# Patient Record
Sex: FEMALE | Race: WHITE | NOT HISPANIC OR LATINO | URBAN - METROPOLITAN AREA
[De-identification: names, ages, dates, MRNs, and addresses within clinical notes are randomized per-mention and may not be internally consistent; named-entity substitution may affect disease eponyms.]

---

## 2019-01-10 ENCOUNTER — EMERGENCY (EMERGENCY)
Facility: HOSPITAL | Age: 84
LOS: 0 days | Discharge: HOME | End: 2019-01-10
Attending: EMERGENCY MEDICINE | Admitting: EMERGENCY MEDICINE
Payer: MEDICARE

## 2019-01-10 VITALS
OXYGEN SATURATION: 97 % | TEMPERATURE: 98 F | RESPIRATION RATE: 17 BRPM | DIASTOLIC BLOOD PRESSURE: 72 MMHG | SYSTOLIC BLOOD PRESSURE: 165 MMHG | HEART RATE: 75 BPM

## 2019-01-10 VITALS
TEMPERATURE: 97 F | DIASTOLIC BLOOD PRESSURE: 80 MMHG | HEART RATE: 70 BPM | SYSTOLIC BLOOD PRESSURE: 190 MMHG | OXYGEN SATURATION: 96 % | WEIGHT: 125 LBS | RESPIRATION RATE: 18 BRPM | HEIGHT: 65 IN

## 2019-01-10 DIAGNOSIS — I67.89 OTHER CEREBROVASCULAR DISEASE: ICD-10-CM

## 2019-01-10 DIAGNOSIS — M47.9 SPONDYLOSIS, UNSPECIFIED: ICD-10-CM

## 2019-01-10 DIAGNOSIS — M48.00 SPINAL STENOSIS, SITE UNSPECIFIED: ICD-10-CM

## 2019-01-10 DIAGNOSIS — E78.5 HYPERLIPIDEMIA, UNSPECIFIED: ICD-10-CM

## 2019-01-10 DIAGNOSIS — I10 ESSENTIAL (PRIMARY) HYPERTENSION: ICD-10-CM

## 2019-01-10 DIAGNOSIS — I25.10 ATHEROSCLEROTIC HEART DISEASE OF NATIVE CORONARY ARTERY WITHOUT ANGINA PECTORIS: ICD-10-CM

## 2019-01-10 DIAGNOSIS — M50.30 OTHER CERVICAL DISC DEGENERATION, UNSPECIFIED CERVICAL REGION: ICD-10-CM

## 2019-01-10 DIAGNOSIS — I49.3 VENTRICULAR PREMATURE DEPOLARIZATION: ICD-10-CM

## 2019-01-10 DIAGNOSIS — M25.512 PAIN IN LEFT SHOULDER: ICD-10-CM

## 2019-01-10 DIAGNOSIS — Z88.5 ALLERGY STATUS TO NARCOTIC AGENT: ICD-10-CM

## 2019-01-10 DIAGNOSIS — M25.519 PAIN IN UNSPECIFIED SHOULDER: ICD-10-CM

## 2019-01-10 DIAGNOSIS — M19.90 UNSPECIFIED OSTEOARTHRITIS, UNSPECIFIED SITE: ICD-10-CM

## 2019-01-10 PROCEDURE — 99284 EMERGENCY DEPT VISIT MOD MDM: CPT | Mod: 25

## 2019-01-10 PROCEDURE — 71045 X-RAY EXAM CHEST 1 VIEW: CPT | Mod: 26

## 2019-01-10 PROCEDURE — 73030 X-RAY EXAM OF SHOULDER: CPT | Mod: 26,LT

## 2019-01-10 PROCEDURE — 70450 CT HEAD/BRAIN W/O DYE: CPT | Mod: 26

## 2019-01-10 PROCEDURE — 72125 CT NECK SPINE W/O DYE: CPT | Mod: 26

## 2019-01-10 RX ORDER — AZITHROMYCIN 500 MG/1
500 TABLET, FILM COATED ORAL ONCE
Qty: 0 | Refills: 0 | Status: COMPLETED | OUTPATIENT
Start: 2019-01-10 | End: 2019-01-10

## 2019-01-10 RX ADMIN — AZITHROMYCIN 500 MILLIGRAM(S): 500 TABLET, FILM COATED ORAL at 05:18

## 2019-01-10 NOTE — ED PROVIDER NOTE - PROGRESS NOTE DETAILS
possible infiltrate on xray, pt afebrile, in no respiratory distress, pox > 95% on ra.  pt given rx zpack. daughter aware.  will give first dose in ed prior to d/c
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
## 2019-01-10 NOTE — ED ADULT TRIAGE NOTE - CHIEF COMPLAINT QUOTE
as per ems, pt c/o left shoulder pain. Pt confused. Limited hx obtained in Triage. Denies chest pain, new injury or fall.

## 2019-01-10 NOTE — ED PROVIDER NOTE - NSFOLLOWUPINSTRUCTIONS_ED_ALL_ED_FT
Follow up with your doctor today  Tylenol for pain  Take zithromax as prescribed  Return to ED for any further concerns.

## 2019-01-10 NOTE — ED PROVIDER NOTE - MUSCULOSKELETAL, MLM
Spine appears normal; tender left trapezius but able to range left UE with mild discomfort.  Radial 2+,  5/5

## 2019-01-10 NOTE — ED ADULT NURSE NOTE - NSIMPLEMENTINTERV_GEN_ALL_ED
Implemented All Fall Risk Interventions:  [place] to call system. Call bell, personal items and telephone within reach. Instruct patient to call for assistance. Room bathroom lighting operational. Non-slip footwear when patient is off stretcher. Physically safe environment: no spills, clutter or unnecessary equipment. Stretcher in lowest position, wheels locked, appropriate side rails in place. Provide visual cue, wrist band, yellow gown, etc. Monitor gait and stability. Monitor for mental status changes and reorient to person, place, and time. Review medications for side effects contributing to fall risk. Reinforce activity limits and safety measures with patient and family.

## 2019-01-10 NOTE — ED ADULT NURSE NOTE - OBJECTIVE STATEMENT
Pt is a 89 y.o. female sent from [facility], c/o Left Shoulder pain/injury. per the written documentation from the facility "pt hit her head." unclear as to whether she had a fall. pt is FULL CODE. pt is A&Ox3, confused at times. daughter is at the bedside. pt is unsure as to whether she fell. per daughter the shoulder pain is chronic. uses walker at home to ambulate. no visible signs of head trauma noted. no lacerations, bumps or bruising noted. vital signs as charted. will continue to monitor.

## 2019-01-10 NOTE — ED ADULT NURSE NOTE - PMH
Arteriosclerotic heart disease (ASHD)    Deviated septum    Hypercholesterolemia    Hypertension, unspecified type    Osteoarthritis, unspecified osteoarthritis type, unspecified site    Pancreatic mass    PVCs (premature ventricular contractions)    Spinal stenosis, unspecified spinal region

## 2019-01-10 NOTE — ED PROVIDER NOTE - OBJECTIVE STATEMENT
89 yo female with h/o CAD, HTN, HLD, pancreatic mass, c/o left shoulder pain.  Patient is confused (baseline) and is not sure what happened.  States she fell but cant relate when or how.  Pt c/o pain in the left posterior neck into the left shoulder.  Patient's daughter states pt has long complained of left shoulder pain, exacerbated by a fall a few years ago.  As per RN at the assisted living, pt was found sitting in her bed c/o pain and states she fell but never was found on the floor or had any reported falls. 89 yo female with h/o CAD, HTN, HLD, pancreatic mass, c/o left shoulder pain.  Patient is confused (baseline) and is not sure what happened.  States she fell but cant relate when or how.  Pt c/o pain in the left posterior neck into the left shoulder.  Patient's daughter states pt has long complained of left shoulder pain, exacerbated by a fall a few years ago.  As per RN at the assisted living, pt was found sitting in her bed c/o pain and states she fell but never was found on the floor or had any reported falls.  Given tylenol pta.  Pt started on zpack today for URI, did not start the first dose.

## 2019-12-16 ENCOUNTER — EMERGENCY (EMERGENCY)
Facility: HOSPITAL | Age: 84
LOS: 0 days | Discharge: HOME | End: 2019-12-16
Attending: EMERGENCY MEDICINE
Payer: MEDICARE

## 2019-12-16 VITALS
SYSTOLIC BLOOD PRESSURE: 134 MMHG | WEIGHT: 130.07 LBS | OXYGEN SATURATION: 100 % | HEART RATE: 65 BPM | RESPIRATION RATE: 18 BRPM | DIASTOLIC BLOOD PRESSURE: 80 MMHG

## 2019-12-16 VITALS
HEART RATE: 77 BPM | OXYGEN SATURATION: 100 % | RESPIRATION RATE: 17 BRPM | TEMPERATURE: 98 F | SYSTOLIC BLOOD PRESSURE: 183 MMHG | DIASTOLIC BLOOD PRESSURE: 88 MMHG

## 2019-12-16 DIAGNOSIS — I10 ESSENTIAL (PRIMARY) HYPERTENSION: ICD-10-CM

## 2019-12-16 DIAGNOSIS — E78.5 HYPERLIPIDEMIA, UNSPECIFIED: ICD-10-CM

## 2019-12-16 DIAGNOSIS — Z88.5 ALLERGY STATUS TO NARCOTIC AGENT: ICD-10-CM

## 2019-12-16 DIAGNOSIS — M19.90 UNSPECIFIED OSTEOARTHRITIS, UNSPECIFIED SITE: ICD-10-CM

## 2019-12-16 DIAGNOSIS — I25.10 ATHEROSCLEROTIC HEART DISEASE OF NATIVE CORONARY ARTERY WITHOUT ANGINA PECTORIS: ICD-10-CM

## 2019-12-16 DIAGNOSIS — R55 SYNCOPE AND COLLAPSE: ICD-10-CM

## 2019-12-16 PROBLEM — J34.2 DEVIATED NASAL SEPTUM: Chronic | Status: ACTIVE | Noted: 2019-01-10

## 2019-12-16 PROBLEM — K86.9 DISEASE OF PANCREAS, UNSPECIFIED: Chronic | Status: ACTIVE | Noted: 2019-01-10

## 2019-12-16 PROBLEM — E78.00 PURE HYPERCHOLESTEROLEMIA, UNSPECIFIED: Chronic | Status: ACTIVE | Noted: 2019-01-10

## 2019-12-16 PROBLEM — M48.00 SPINAL STENOSIS, SITE UNSPECIFIED: Chronic | Status: ACTIVE | Noted: 2019-01-10

## 2019-12-16 PROBLEM — I49.3 VENTRICULAR PREMATURE DEPOLARIZATION: Chronic | Status: ACTIVE | Noted: 2019-01-10

## 2019-12-16 LAB
ALBUMIN SERPL ELPH-MCNC: 3.6 G/DL — SIGNIFICANT CHANGE UP (ref 3.3–5)
ALP SERPL-CCNC: 114 U/L — SIGNIFICANT CHANGE UP (ref 40–120)
ALT FLD-CCNC: 17 U/L — SIGNIFICANT CHANGE UP (ref 12–78)
ANION GAP SERPL CALC-SCNC: 8 MMOL/L — SIGNIFICANT CHANGE UP (ref 5–17)
APPEARANCE UR: CLEAR — SIGNIFICANT CHANGE UP
APTT BLD: 28.8 SEC — SIGNIFICANT CHANGE UP (ref 27.5–36.3)
AST SERPL-CCNC: 25 U/L — SIGNIFICANT CHANGE UP (ref 15–37)
BASOPHILS # BLD AUTO: 0.03 K/UL — SIGNIFICANT CHANGE UP (ref 0–0.2)
BASOPHILS NFR BLD AUTO: 0.6 % — SIGNIFICANT CHANGE UP (ref 0–2)
BILIRUB SERPL-MCNC: 0.5 MG/DL — SIGNIFICANT CHANGE UP (ref 0.2–1.2)
BILIRUB UR-MCNC: NEGATIVE — SIGNIFICANT CHANGE UP
BUN SERPL-MCNC: 21 MG/DL — SIGNIFICANT CHANGE UP (ref 7–23)
CALCIUM SERPL-MCNC: 10 MG/DL — SIGNIFICANT CHANGE UP (ref 8.5–10.1)
CHLORIDE SERPL-SCNC: 106 MMOL/L — SIGNIFICANT CHANGE UP (ref 96–108)
CO2 SERPL-SCNC: 24 MMOL/L — SIGNIFICANT CHANGE UP (ref 22–31)
COLOR SPEC: YELLOW — SIGNIFICANT CHANGE UP
CREAT SERPL-MCNC: 0.96 MG/DL — SIGNIFICANT CHANGE UP (ref 0.5–1.3)
DIFF PNL FLD: NEGATIVE — SIGNIFICANT CHANGE UP
EOSINOPHIL # BLD AUTO: 0.05 K/UL — SIGNIFICANT CHANGE UP (ref 0–0.5)
EOSINOPHIL NFR BLD AUTO: 0.9 % — SIGNIFICANT CHANGE UP (ref 0–6)
GLUCOSE SERPL-MCNC: 136 MG/DL — HIGH (ref 70–99)
GLUCOSE UR QL: NEGATIVE MG/DL — SIGNIFICANT CHANGE UP
HCT VFR BLD CALC: 40.7 % — SIGNIFICANT CHANGE UP (ref 34.5–45)
HGB BLD-MCNC: 12.9 G/DL — SIGNIFICANT CHANGE UP (ref 11.5–15.5)
IMM GRANULOCYTES NFR BLD AUTO: 0.4 % — SIGNIFICANT CHANGE UP (ref 0–1.5)
INR BLD: 0.98 RATIO — SIGNIFICANT CHANGE UP (ref 0.88–1.16)
KETONES UR-MCNC: NEGATIVE — SIGNIFICANT CHANGE UP
LEUKOCYTE ESTERASE UR-ACNC: NEGATIVE — SIGNIFICANT CHANGE UP
LIDOCAIN IGE QN: 94 U/L — SIGNIFICANT CHANGE UP (ref 73–393)
LYMPHOCYTES # BLD AUTO: 1.05 K/UL — SIGNIFICANT CHANGE UP (ref 1–3.3)
LYMPHOCYTES # BLD AUTO: 19.7 % — SIGNIFICANT CHANGE UP (ref 13–44)
MCHC RBC-ENTMCNC: 30.3 PG — SIGNIFICANT CHANGE UP (ref 27–34)
MCHC RBC-ENTMCNC: 31.7 GM/DL — LOW (ref 32–36)
MCV RBC AUTO: 95.5 FL — SIGNIFICANT CHANGE UP (ref 80–100)
MONOCYTES # BLD AUTO: 0.44 K/UL — SIGNIFICANT CHANGE UP (ref 0–0.9)
MONOCYTES NFR BLD AUTO: 8.3 % — SIGNIFICANT CHANGE UP (ref 2–14)
NEUTROPHILS # BLD AUTO: 3.73 K/UL — SIGNIFICANT CHANGE UP (ref 1.8–7.4)
NEUTROPHILS NFR BLD AUTO: 70.1 % — SIGNIFICANT CHANGE UP (ref 43–77)
NITRITE UR-MCNC: NEGATIVE — SIGNIFICANT CHANGE UP
PH UR: 7 — SIGNIFICANT CHANGE UP (ref 5–8)
PLATELET # BLD AUTO: 209 K/UL — SIGNIFICANT CHANGE UP (ref 150–400)
POTASSIUM SERPL-MCNC: 4.4 MMOL/L — SIGNIFICANT CHANGE UP (ref 3.5–5.3)
POTASSIUM SERPL-SCNC: 4.4 MMOL/L — SIGNIFICANT CHANGE UP (ref 3.5–5.3)
PROT SERPL-MCNC: 7.6 GM/DL — SIGNIFICANT CHANGE UP (ref 6–8.3)
PROT UR-MCNC: 15 MG/DL
PROTHROM AB SERPL-ACNC: 10.9 SEC — SIGNIFICANT CHANGE UP (ref 10–12.9)
RBC # BLD: 4.26 M/UL — SIGNIFICANT CHANGE UP (ref 3.8–5.2)
RBC # FLD: 12.9 % — SIGNIFICANT CHANGE UP (ref 10.3–14.5)
SODIUM SERPL-SCNC: 138 MMOL/L — SIGNIFICANT CHANGE UP (ref 135–145)
SP GR SPEC: 1 — LOW (ref 1.01–1.02)
TROPONIN I SERPL-MCNC: 0.02 NG/ML — SIGNIFICANT CHANGE UP (ref 0.01–0.04)
TROPONIN I SERPL-MCNC: 0.02 NG/ML — SIGNIFICANT CHANGE UP (ref 0.01–0.04)
UROBILINOGEN FLD QL: NEGATIVE MG/DL — SIGNIFICANT CHANGE UP
WBC # BLD: 5.32 K/UL — SIGNIFICANT CHANGE UP (ref 3.8–10.5)
WBC # FLD AUTO: 5.32 K/UL — SIGNIFICANT CHANGE UP (ref 3.8–10.5)

## 2019-12-16 PROCEDURE — 99284 EMERGENCY DEPT VISIT MOD MDM: CPT

## 2019-12-16 PROCEDURE — 80053 COMPREHEN METABOLIC PANEL: CPT

## 2019-12-16 PROCEDURE — 85730 THROMBOPLASTIN TIME PARTIAL: CPT

## 2019-12-16 PROCEDURE — 70450 CT HEAD/BRAIN W/O DYE: CPT

## 2019-12-16 PROCEDURE — 70450 CT HEAD/BRAIN W/O DYE: CPT | Mod: 26

## 2019-12-16 PROCEDURE — 71045 X-RAY EXAM CHEST 1 VIEW: CPT | Mod: 26

## 2019-12-16 PROCEDURE — 85610 PROTHROMBIN TIME: CPT

## 2019-12-16 PROCEDURE — 83690 ASSAY OF LIPASE: CPT

## 2019-12-16 PROCEDURE — 36415 COLL VENOUS BLD VENIPUNCTURE: CPT

## 2019-12-16 PROCEDURE — 71045 X-RAY EXAM CHEST 1 VIEW: CPT

## 2019-12-16 PROCEDURE — 81001 URINALYSIS AUTO W/SCOPE: CPT

## 2019-12-16 PROCEDURE — 93005 ELECTROCARDIOGRAM TRACING: CPT

## 2019-12-16 PROCEDURE — 93010 ELECTROCARDIOGRAM REPORT: CPT

## 2019-12-16 PROCEDURE — 84484 ASSAY OF TROPONIN QUANT: CPT

## 2019-12-16 PROCEDURE — 85025 COMPLETE CBC W/AUTO DIFF WBC: CPT

## 2019-12-16 PROCEDURE — 99284 EMERGENCY DEPT VISIT MOD MDM: CPT | Mod: 25

## 2019-12-16 RX ORDER — SODIUM CHLORIDE 9 MG/ML
500 INJECTION INTRAMUSCULAR; INTRAVENOUS; SUBCUTANEOUS ONCE
Refills: 0 | Status: COMPLETED | OUTPATIENT
Start: 2019-12-16 | End: 2019-12-16

## 2019-12-16 RX ADMIN — SODIUM CHLORIDE 1000 MILLILITER(S): 9 INJECTION INTRAMUSCULAR; INTRAVENOUS; SUBCUTANEOUS at 16:45

## 2019-12-16 NOTE — ED ADULT NURSE NOTE - OBJECTIVE STATEMENT
Pt presents to ED BIBEMS from [facility] for near syncopal episode. Pt unaware why she is here. Denies CP,

## 2019-12-16 NOTE — ED PROVIDER NOTE - NS_ ATTENDINGSCRIBEDETAILS _ED_A_ED_FT
I, Alfie Lees MD,  performed the initial face to face bedside interview with this patient regarding history of present illness, review of symptoms and relevant past medical, social and family history.  I completed an independent physical examination.  I was the initial provider who evaluated this patient.  The history, relevant review of systems, past medical and surgical history, medical decision making, and physical examination was documented by the scribe in my presence and I attest to the accuracy of the documentation.

## 2019-12-16 NOTE — ED PROVIDER NOTE - PHYSICAL EXAMINATION
Constitutional: NAD AAOx2  Eyes: PERRLA EOMI  Head: Normocephalic atraumatic  Mouth: MMM  Cardiac: regular rate   Resp: Lungs CTAB  GI: Abd s/nt/nd  Neuro: CN2-12 intact  Skin: No rashes

## 2019-12-16 NOTE — ED PROVIDER NOTE - OBJECTIVE STATEMENT
89 y/o female with a PMHx of ASHD, deviated septum, hypercholesterolemia, HTN, osteoarthritis, pancreatic mass, PVCs, spinal stenosis, presents to ED BIBEMS from [facility] for near syncopal episode. Pt unaware why she is here. Denies CP, SOB. No other complaints at this time.

## 2019-12-16 NOTE — ED PROVIDER NOTE - PROGRESS NOTE DETAILS
Patient examined at bedside with Dr. Lees. 89 y/o F presents from [facility] with near syncopal episode. Pt A&O x2, reports dry mouth. Denies CP, SOB, nausea, vomiting, fever. As per accompanying documentation, patient is ambulatory with walker at baseline. Will assess labs, EKG, UA, head CT, provide IVF and reassess. - Rene Vargas PA-C Symptoms improved, UA and 2nd troponin pending. - Rene Vargas PA-C took signout from Dr Lees.  trop neg x 2 and UA neg  OK for DC.  daughter will take her home.  MD Ramya

## 2019-12-16 NOTE — ED PROVIDER NOTE - NS ED ROS FT
Constitutional: No fever or chills  Eyes: No visual changes  HEENT: No throat pain  CV: No chest pain. +near syncope.   Resp: No SOB no cough  GI: No abd pain, nausea or vomiting  : No dysuria  MSK: No musculoskeletal pain  Skin: No rash  Neuro: No headache

## 2019-12-16 NOTE — ED PROVIDER NOTE - CLINICAL SUMMARY MEDICAL DECISION MAKING FREE TEXT BOX
89 y/o female with hx of ASHD, deviated septum, hypercholesterolemia, HTN, osteoarthritis, pancreatic mass, PVCs, spinal stenosis presents to ED for near syncope. Exam nonfocal. Will obtain labs, urine CT head, reassess.

## 2019-12-16 NOTE — ED PROVIDER NOTE - PATIENT PORTAL LINK FT
You can access the FollowMyHealth Patient Portal offered by [facility] by registering at the following website: [email or web address]. By joining [facility]’s FollowMyHealth portal, you will also be able to view your health information using other applications (apps) compatible with our system.

## 2020-06-17 ENCOUNTER — INPATIENT (INPATIENT)
Facility: HOSPITAL | Age: 85
LOS: 8 days | Discharge: HOSPICE | DRG: 175 | End: 2020-06-26
Attending: INTERNAL MEDICINE | Admitting: INTERNAL MEDICINE
Payer: MEDICARE

## 2020-06-17 VITALS
HEART RATE: 68 BPM | DIASTOLIC BLOOD PRESSURE: 99 MMHG | TEMPERATURE: 98 F | OXYGEN SATURATION: 84 % | SYSTOLIC BLOOD PRESSURE: 136 MMHG | WEIGHT: 119.93 LBS | HEIGHT: 62 IN | RESPIRATION RATE: 16 BRPM

## 2020-06-17 LAB
ALBUMIN SERPL ELPH-MCNC: 2.9 G/DL — LOW (ref 3.3–5)
ALP SERPL-CCNC: 139 U/L — HIGH (ref 40–120)
ALT FLD-CCNC: 29 U/L — SIGNIFICANT CHANGE UP (ref 12–78)
ANION GAP SERPL CALC-SCNC: 7 MMOL/L — SIGNIFICANT CHANGE UP (ref 5–17)
APPEARANCE UR: CLEAR — SIGNIFICANT CHANGE UP
APTT BLD: 29.9 SEC — SIGNIFICANT CHANGE UP (ref 27.5–36.3)
AST SERPL-CCNC: 38 U/L — HIGH (ref 15–37)
BASOPHILS # BLD AUTO: 0.04 K/UL — SIGNIFICANT CHANGE UP (ref 0–0.2)
BASOPHILS NFR BLD AUTO: 0.7 % — SIGNIFICANT CHANGE UP (ref 0–2)
BILIRUB SERPL-MCNC: 1 MG/DL — SIGNIFICANT CHANGE UP (ref 0.2–1.2)
BILIRUB UR-MCNC: NEGATIVE — SIGNIFICANT CHANGE UP
BLOOD GAS COMMENTS ARTERIAL: SIGNIFICANT CHANGE UP
BUN SERPL-MCNC: 31 MG/DL — HIGH (ref 7–23)
CALCIUM SERPL-MCNC: 9.4 MG/DL — SIGNIFICANT CHANGE UP (ref 8.5–10.1)
CHLORIDE SERPL-SCNC: 106 MMOL/L — SIGNIFICANT CHANGE UP (ref 96–108)
CO2 SERPL-SCNC: 25 MMOL/L — SIGNIFICANT CHANGE UP (ref 22–31)
COLOR SPEC: YELLOW — SIGNIFICANT CHANGE UP
CREAT SERPL-MCNC: 1.25 MG/DL — SIGNIFICANT CHANGE UP (ref 0.5–1.3)
D DIMER BLD IA.RAPID-MCNC: 708 NG/ML DDU — HIGH
DIFF PNL FLD: NEGATIVE — SIGNIFICANT CHANGE UP
EOSINOPHIL # BLD AUTO: 0.13 K/UL — SIGNIFICANT CHANGE UP (ref 0–0.5)
EOSINOPHIL NFR BLD AUTO: 2.3 % — SIGNIFICANT CHANGE UP (ref 0–6)
GAS PNL BLDA: SIGNIFICANT CHANGE UP
GLUCOSE SERPL-MCNC: 129 MG/DL — HIGH (ref 70–99)
GLUCOSE UR QL: NEGATIVE MG/DL — SIGNIFICANT CHANGE UP
HCO3 BLDA-SCNC: 25 MMOL/L — SIGNIFICANT CHANGE UP (ref 21–29)
HCT VFR BLD CALC: 45.9 % — HIGH (ref 34.5–45)
HGB BLD-MCNC: 14 G/DL — SIGNIFICANT CHANGE UP (ref 11.5–15.5)
IMM GRANULOCYTES NFR BLD AUTO: 0.4 % — SIGNIFICANT CHANGE UP (ref 0–1.5)
INR BLD: 1.11 RATIO — SIGNIFICANT CHANGE UP (ref 0.88–1.16)
KETONES UR-MCNC: NEGATIVE — SIGNIFICANT CHANGE UP
LACTATE SERPL-SCNC: 2.1 MMOL/L — HIGH (ref 0.7–2)
LEUKOCYTE ESTERASE UR-ACNC: ABNORMAL
LYMPHOCYTES # BLD AUTO: 1.2 K/UL — SIGNIFICANT CHANGE UP (ref 1–3.3)
LYMPHOCYTES # BLD AUTO: 21.4 % — SIGNIFICANT CHANGE UP (ref 13–44)
MCHC RBC-ENTMCNC: 28.5 PG — SIGNIFICANT CHANGE UP (ref 27–34)
MCHC RBC-ENTMCNC: 30.5 GM/DL — LOW (ref 32–36)
MCV RBC AUTO: 93.5 FL — SIGNIFICANT CHANGE UP (ref 80–100)
MONOCYTES # BLD AUTO: 0.78 K/UL — SIGNIFICANT CHANGE UP (ref 0–0.9)
MONOCYTES NFR BLD AUTO: 13.9 % — SIGNIFICANT CHANGE UP (ref 2–14)
NEUTROPHILS # BLD AUTO: 3.43 K/UL — SIGNIFICANT CHANGE UP (ref 1.8–7.4)
NEUTROPHILS NFR BLD AUTO: 61.3 % — SIGNIFICANT CHANGE UP (ref 43–77)
NITRITE UR-MCNC: NEGATIVE — SIGNIFICANT CHANGE UP
NT-PROBNP SERPL-SCNC: 6133 PG/ML — HIGH (ref 0–450)
PCO2 BLDA: 45 MMHG — SIGNIFICANT CHANGE UP (ref 32–46)
PH BLDA: 7.37 — SIGNIFICANT CHANGE UP (ref 7.35–7.45)
PH UR: 5 — SIGNIFICANT CHANGE UP (ref 5–8)
PLATELET # BLD AUTO: 196 K/UL — SIGNIFICANT CHANGE UP (ref 150–400)
PO2 BLDA: 287 MMHG — HIGH (ref 74–108)
POTASSIUM SERPL-MCNC: 4.3 MMOL/L — SIGNIFICANT CHANGE UP (ref 3.5–5.3)
POTASSIUM SERPL-SCNC: 4.3 MMOL/L — SIGNIFICANT CHANGE UP (ref 3.5–5.3)
PROT SERPL-MCNC: 7.1 GM/DL — SIGNIFICANT CHANGE UP (ref 6–8.3)
PROT UR-MCNC: 30 MG/DL
PROTHROM AB SERPL-ACNC: 12.4 SEC — SIGNIFICANT CHANGE UP (ref 10–12.9)
RBC # BLD: 4.91 M/UL — SIGNIFICANT CHANGE UP (ref 3.8–5.2)
RBC # FLD: 16.3 % — HIGH (ref 10.3–14.5)
SAO2 % BLDA: 100 % — HIGH (ref 92–96)
SODIUM SERPL-SCNC: 138 MMOL/L — SIGNIFICANT CHANGE UP (ref 135–145)
SP GR SPEC: 1.01 — SIGNIFICANT CHANGE UP (ref 1.01–1.02)
TROPONIN I SERPL-MCNC: 0.05 NG/ML — HIGH (ref 0.01–0.04)
TROPONIN I SERPL-MCNC: 0.06 NG/ML — HIGH (ref 0.01–0.04)
UROBILINOGEN FLD QL: NEGATIVE MG/DL — SIGNIFICANT CHANGE UP
WBC # BLD: 5.6 K/UL — SIGNIFICANT CHANGE UP (ref 3.8–10.5)
WBC # FLD AUTO: 5.6 K/UL — SIGNIFICANT CHANGE UP (ref 3.8–10.5)

## 2020-06-17 PROCEDURE — 71045 X-RAY EXAM CHEST 1 VIEW: CPT | Mod: 26

## 2020-06-17 RX ORDER — ENOXAPARIN SODIUM 100 MG/ML
60 INJECTION SUBCUTANEOUS ONCE
Refills: 0 | Status: COMPLETED | OUTPATIENT
Start: 2020-06-17 | End: 2020-06-17

## 2020-06-17 RX ORDER — AZITHROMYCIN 500 MG/1
500 TABLET, FILM COATED ORAL ONCE
Refills: 0 | Status: COMPLETED | OUTPATIENT
Start: 2020-06-17 | End: 2020-06-17

## 2020-06-17 RX ORDER — CEFTRIAXONE 500 MG/1
1000 INJECTION, POWDER, FOR SOLUTION INTRAMUSCULAR; INTRAVENOUS ONCE
Refills: 0 | Status: COMPLETED | OUTPATIENT
Start: 2020-06-17 | End: 2020-06-17

## 2020-06-17 RX ORDER — DILTIAZEM HCL 120 MG
10 CAPSULE, EXT RELEASE 24 HR ORAL ONCE
Refills: 0 | Status: COMPLETED | OUTPATIENT
Start: 2020-06-17 | End: 2020-06-17

## 2020-06-17 RX ORDER — FUROSEMIDE 40 MG
40 TABLET ORAL ONCE
Refills: 0 | Status: COMPLETED | OUTPATIENT
Start: 2020-06-17 | End: 2020-06-17

## 2020-06-17 RX ADMIN — CEFTRIAXONE 1000 MILLIGRAM(S): 500 INJECTION, POWDER, FOR SOLUTION INTRAMUSCULAR; INTRAVENOUS at 20:44

## 2020-06-17 RX ADMIN — AZITHROMYCIN 500 MILLIGRAM(S): 500 TABLET, FILM COATED ORAL at 20:44

## 2020-06-17 RX ADMIN — Medication 40 MILLIGRAM(S): at 20:45

## 2020-06-17 RX ADMIN — Medication 10 MILLIGRAM(S): at 19:37

## 2020-06-17 NOTE — H&P ADULT - NSHPREVIEWOFSYSTEMS_GEN_ALL_CORE
ROS  Gen: no fevers, chills, sweats, weight loss, fatigue  Visual: no recent changes in vision, no blurriness, no seeing spots  Cardiovascular: no chest pain, no palpitations, no orthopnea, no leg swelling  Respiratory: no shortness of breath, no exertional dyspnea, no cough, no rhinorrhea, no nasal congestion  GI: no difficulty swallowing, no nausea, no vomiting, no abdominal pain, no diarrhea, no constipation, no melana  : no dysuria, no increased freq, no hematuria, no malodorous urine  Derm: no wounds, no rashes  Heme: no easy bleeding or bruising  MSK: no joint pain, no joint swelling or redness, no extremity pain   Neuro: no headache, no numbness, no weakness, no memory loss  Psych: no depression, no anxiety, no SI ROS: unable to obtain due to pt status

## 2020-06-17 NOTE — ED ADULT NURSE NOTE - NSIMPLEMENTINTERV_GEN_ALL_ED
Implemented All Fall with Harm Risk Interventions:  [place] to call system. Call bell, personal items and telephone within reach. Instruct patient to call for assistance. Room bathroom lighting operational. Non-slip footwear when patient is off stretcher. Physically safe environment: no spills, clutter or unnecessary equipment. Stretcher in lowest position, wheels locked, appropriate side rails in place. Provide visual cue, wrist band, yellow gown, etc. Monitor gait and stability. Monitor for mental status changes and reorient to person, place, and time. Review medications for side effects contributing to fall risk. Reinforce activity limits and safety measures with patient and family. Provide visual clues: red socks.

## 2020-06-17 NOTE — H&P ADULT - NSHPPHYSICALEXAM_GEN_ALL_CORE
Vitals  T(F): 98.5 (06-17-20 @ 18:00), Max: 98.5 (06-17-20 @ 18:00)  HR: 92 (06-17-20 @ 20:45) (68 - 132)  BP: 105/86 (06-17-20 @ 20:45) (105/86 - 136/99)  RR: 16 (06-17-20 @ 17:45) (16 - 16)  SpO2: 100% (06-17-20 @ 19:35) (84% - 100%)    Physical Exam   Gen: NAD, comfortable  HENT: atraumatic head and ears, no gross abnormalities of ears, mucous membranes moist, no oral lesions, neck supple without masses/goiter/lymphadenopathy  CV: RRR, nl s1/s2, no M/R/G  Pulm: nl respiratory effort, CTAB, no wheezes/crackles/rhonchi  Back: no scoliosis, lordosis, or kyphosis, no tenderness  Abd: normoactive bowel sounds in all 4 quadrants, soft, nontender, nondistended, no rebound, no guarding, no masses  Extremities: no pedal edema, pedal pulses palpable   Skin: nl warm and dry, no wounds   Neuro: A&Ox3, answering questions appropriately, PERRL, EOMI, face symmetric, sensation equal bilaterally in face, tongue midline, no dysarthria, 5/5 strength in upper and lower extremities bilaterally, sensation intact in upper and lower extremities bilaterally, nl finger to nose, and nl heel to shin   Pysch: no depression, no SI, no HI Vitals  T(F): 98.5 (06-17-20 @ 18:00), Max: 98.5 (06-17-20 @ 18:00)  HR: 92 (06-17-20 @ 20:45) (68 - 132)  BP: 105/86 (06-17-20 @ 20:45) (105/86 - 136/99)  RR: 16 (06-17-20 @ 17:45) (16 - 16)  SpO2: 100% (06-17-20 @ 19:35) (84% - 100%)    Physical Exam   Gen: NAD, confused  HENT: atraumatic head and ears, no gross abnormalities of ears, mucous membranes moist, no oral lesions, neck supple without masses/goiter/lymphadenopathy  CV: irregularly irregular, nl s1/s2, no M/R/G, +JVD  Pulm: decreased breath sounds on right, basilar crackles noted on L  Back: no TTP  Abd: normoactive bowel sounds in all 4 quadrants, soft, nontender, nondistended, no rebound, no guarding, no masses  Extremities: 3+ pitting edema, pedal edema, pedal pulses palpable   Skin: nl warm and dry, no wounds   Neuro: A&Ox3, answering questions appropriately, PERRL, EOMI, face symmetric, sensation equal bilaterally in face, tongue midline, no dysarthria, 5/5 strength in upper and lower extremities bilaterally, sensation intact in upper and lower extremities bilaterally, nl finger to nose, and nl heel to shin   Pysch: no depression, no SI, no HI Vitals  T(F): 98.5 (06-17-20 @ 18:00), Max: 98.5 (06-17-20 @ 18:00)  HR: 92 (06-17-20 @ 20:45) (68 - 132)  BP: 105/86 (06-17-20 @ 20:45) (105/86 - 136/99)  RR: 16 (06-17-20 @ 17:45) (16 - 16)  SpO2: 100% (06-17-20 @ 19:35) (84% - 100%)    Physical Exam   Gen: NAD, confused  HENT: atraumatic head and ears, no gross abnormalities of ears, mucous membranes moist, no oral lesions, neck supple without masses/goiter/lymphadenopathy  CV: irregularly irregular, nl s1/s2, no M/R/G, +JVD  Pulm: decreased breath sounds on right, basilar crackles noted on L  Back: no TTP  Abd: normoactive bowel sounds in all 4 quadrants, soft, nontender, nondistended, no rebound, no guarding, no masses  Extremities: 3+ pitting edema up to mid-thigh   Skin: nl warm and dry  Neuro: alert and oriented, no focal deficits Vitals  T(F): 98.5 (06-17-20 @ 18:00), Max: 98.5 (06-17-20 @ 18:00)  HR: 92 (06-17-20 @ 20:45) (68 - 132)  BP: 105/86 (06-17-20 @ 20:45) (105/86 - 136/99)  RR: 16 (06-17-20 @ 17:45) (16 - 16)  SpO2: 100% (06-17-20 @ 19:35) (84% - 100%)    Physical Exam   Gen: NAD, confused  HENT: atraumatic head and ears, no gross abnormalities of ears, mucous membranes moist, no oral lesions, neck supple without masses/goiter/lymphadenopathy  CV: irregularly irregular, no M/R/G, +JVD  Pulm: decreased breath sounds on right, basilar crackles noted on L  Back: no TTP  Abd: normoactive bowel sounds in all 4 quadrants, soft, nontender, nondistended, no rebound, no guarding, no masses  Extremities: 3+ pitting edema up to mid-thigh   Skin: nl warm and dry  Neuro: alert and oriented x1, no focal deficits

## 2020-06-17 NOTE — H&P ADULT - NSICDXPASTSURGICALHX_GEN_ALL_CORE_FT
PAST SURGICAL HISTORY:  No significant past surgical history PAST SURGICAL HISTORY:  History of coronary artery stent placement approx 2005

## 2020-06-17 NOTE — H&P ADULT - ATTENDING COMMENTS
Patient was seen and examined by me after initial evaluation above with the family resident, Dr. Arechiga.  Case discussed and reviewed in detail with Dr. Arechiga. H&P edited where appropriate. Please see my plan below.    89 y/o F with a PMH CAD s/p PCI x1, b/l hearing loss, pancreatic mass, HLD, HTN, OA, and spinal stenosis brought in from [facility] due to SOB, found to have new onset afib.    Rest of History, ROS, and physical exam as noted above.  All labwork and imaging personally reviewed and interpreted.  EKG personally reviewed and interpreted. Atrial fibrillation with RVR, normal axis. [id number], QTc 449. 1/2 mm ST depressions V4-6, new, but with no reciprocal elevations.    Plan:  1) Acute respiratory failure with hypoxia  - O2 saturations mid-80s without O2, improved on NC  - New onset atrial fibrillation with RVR + CHF exacerbation +/- concern for underlying pneumonia  - More prominent pulmonary edema on R lung but also with right lung opacifications suspicious for PNA  - With borderline lactate elevation  - Pt is afebrile w/o leukocytosis, but does not r/o infection, particularly in elderly  - Cont Ceftriaxone 1g q24h and azithromycin 500mg PO q24h  - F/u Blood/Urine cx, and COVID-19 PCR. Patient is moderate risk for COVID-19  - F/u procalcitonin  - C/w NC, supportive therapy  - Afib and CHF exacerbation workup    2) Atrial fibrillation with RVR  - New onset  - CHADSVASC: 5  - Eliquis 2.5mg BID, adjusted for age and weight  - EKG showing afib w/ RVR Hr 130's  - Rate now controlled s/p Cardizem 10mg IV x 1 dose in ED  - Can c/w home Metoprolol succinate 50mg w/ holding parameters, can increase if not controlled  - May have been initiated by suspected PNA. Rx below  - F/u TSH  - Trend troponin at least x3  - Cardio consult- Dr. Campbell    3) Acute exacerbation of CHF  - in the setting of new onset Afib w/ RVR  - CXR significant for cardiomegaly, concern for pulmonary edema, and proBNP 6133  - Strict I&O's, daily weights  - Lasix 40mg IV BID  - Closely monitor renal function  - ECHO ordered  - Cardio consulted    4) Community Acquired Pneumonia  - With right sided opacifications in addition to R>L pulmonary edema  - C/w Ceftriaxone/Azithromycin QD as noted above  - F/u blood cx and COVID-19 PCR    5) Acute DVT  - D Dimer 708  - US LE doppler-notable for chronic DVT distal L femoral vein and acute L posterior tibial vein  - S/p Lovenox 60mg IM x1 in ED  - A/C w/ Eliquis 2.5mg BID. Risks and benefits of A/C discussed with daughter  - Check CTA to evaluate for PE, although no need for urgent CTA, as it will not     6) Acute Kidney Injury  - Baseline creatinine 0.9, admission creatinine 1.25  - Likely multifactorial due to afib with RVR and CHF  - Monitor closely while on Lasix  - Caution with nephrotoxic agents  - F/u AM BMP    7) Elevated troponin  - Mild, 0.058-->0.053  - Likely due to demand ischemia in the setting of afib with RVR/CHF  - Unlikely ACS  - Trend at least x3  - Cardio to be consulted as noted above    8) Prophylactic measure  - DVT PPX: IMPROVE score of 2, patient on Eliquis  - Diet: DASH  - Dispo: pending workup and improvement in condition    9) Advanced care planning/counseling discussion  - Advanced care planning was discussed with the patient's daughter, Laura Ryder ([phone]), who is the POA. She stated in the event of cardiac or pulmonary arrest, she would want the patient to not have resuscitation or intubation (DNR/DNI).  - MOLST form completed  - 16 minutes spent Patient was seen and examined by me after initial evaluation above with the family resident, Dr. Arechiga.  Case discussed and reviewed in detail with Dr. Arechiga. H&P edited where appropriate. Please see my plan below.    89 y/o F with a PMH CAD s/p PCI x1, b/l hearing loss, pancreatic mass, HLD, HTN, OA, and spinal stenosis brought in from [facility] due to SOB, found to have new onset afib.    Rest of History, ROS, and physical exam as noted above.  All labwork and imaging personally reviewed and interpreted.  EKG personally reviewed and interpreted. Atrial fibrillation with RVR, normal axis. [id number], QTc 449. 1/2 mm ST depressions V4-6, new, but with no reciprocal elevations.    Plan:  1) Acute respiratory failure with hypoxia  - O2 saturations mid-80s without O2, improved on NC  - New onset atrial fibrillation with RVR + CHF exacerbation +/- concern for underlying pneumonia  - More prominent pulmonary edema on R lung but also with right lung opacifications suspicious for PNA  - With borderline lactate elevation  - Pt is afebrile w/o leukocytosis, but does not r/o infection, particularly in elderly  - Cont Ceftriaxone 1g q24h and azithromycin 500mg PO q24h  - F/u Blood/Urine cx, and COVID-19 PCR. Patient is moderate risk for COVID-19  - F/u procalcitonin  - C/w NC, supportive therapy  - With elevated D-dimer and positive DVT, will obtain CTA, although will place on Eliquis regardless for afib  - Afib and CHF exacerbation workup    2) Atrial fibrillation with RVR  - New onset  - CHADSVASC: 5  - Eliquis 2.5mg BID, adjusted for age and weight  - EKG showing afib w/ RVR Hr 130's  - Rate now controlled s/p Cardizem 10mg IV x 1 dose in ED  - Can c/w home Metoprolol succinate 50mg w/ holding parameters, can increase if not controlled  - May have been initiated by suspected PNA. Rx below  - F/u TSH  - Trend troponin at least x3  - Cardio consult- Dr. Campbell    3) Acute exacerbation of CHF  - in the setting of new onset Afib w/ RVR  - CXR significant for cardiomegaly, concern for pulmonary edema, and proBNP 6133  - Strict I&O's, daily weights  - Lasix 40mg IV BID  - Closely monitor renal function  - ECHO ordered  - Cardio consulted    4) Community Acquired Pneumonia  - With right sided opacifications in addition to R>L pulmonary edema  - C/w Ceftriaxone/Azithromycin QD as noted above  - F/u blood cx and COVID-19 PCR    5) Acute DVT  - D Dimer 708  - US LE doppler-notable for chronic DVT distal L femoral vein and acute L posterior tibial vein  - S/p Lovenox 60mg IM x1 in ED  - A/C w/ Eliquis 2.5mg BID. Risks and benefits of A/C discussed with daughter  - Check CTA to evaluate for PE, although no need for urgent CTA, as it will not     6) Acute Kidney Injury  - Baseline creatinine 0.9, admission creatinine 1.25  - Likely multifactorial due to afib with RVR and CHF  - Monitor closely while on Lasix  - Caution with nephrotoxic agents  - F/u AM BMP    7) Elevated troponin  - Mild, 0.058-->0.053  - Likely due to demand ischemia in the setting of afib with RVR/CHF  - Unlikely ACS  - Trend at least x3  - Cardio to be consulted as noted above    8) Prophylactic measure  - DVT PPX: IMPROVE score of 2, patient on Eliquis  - Diet: DASH  - Dispo: pending workup and improvement in condition    9) Advanced care planning/counseling discussion  - Advanced care planning was discussed with the patient's daughter, Laura Ryder ([phone]), who is the POA. She stated in the event of cardiac or pulmonary arrest, she would want the patient to not have resuscitation or intubation (DNR/DNI).  - MOLST form completed  - 16 minutes spent

## 2020-06-17 NOTE — ED PROVIDER NOTE - NS_ ATTENDINGSCRIBEDETAILS _ED_A_ED_FT
I, Duy Perales MD,  performed the initial face to face bedside interview with this patient regarding history of present illness, review of symptoms and relevant past medical, social and family history.  I completed an independent physical examination.    The history, relevant review of systems, past medical and surgical history, medical decision making, and physical examination was documented by the scribe in my presence and I attest to the accuracy of the documentation.

## 2020-06-17 NOTE — ED ADULT TRIAGE NOTE - CHIEF COMPLAINT QUOTE
Patient from [facility] assisted living for SOB and new onset of rapid afib.

## 2020-06-17 NOTE — H&P ADULT - HISTORY OF PRESENT ILLNESS
89 y/o F 89 y/o F PMHx significant for ASHD s/p PCI x1, b/l hearing loss, pancreatic mass, HLD, HTN, OA, and spinal stenosis brought in from [facility] due to SOB, and new onset afib. Pt also noted to be hypoxic in 80's on RA 89 y/o F PMHx significant for ASHD s/p PCI x1, b/l hearing loss, pancreatic mass, HLD, HTN, OA, and spinal stenosis brought in from [facility] due to SOB, and new onset afib. Pt found to be hypoxic at 84% on RA and place on supp O2. Unable to obtain proper history from pt due to confusion. Pt opens eyes to commands and denies pain.     In the ED, CXR showing cardiomegaly with b/l perihilar and basilar airspace consolidations. BNP 6133, D-Dimer 708, Lactate 2.1-->1.9 s/p IVF, Lasix IV x1, CTX and Azt x1 dose, mildly elevated 89 y/o F PMHx significant for ASHD s/p PCI x1, b/l hearing loss, pancreatic mass, HLD, HTN, OA, and spinal stenosis brought in from [facility] due to SOB, and new onset afib. Pt found to be hypoxic at 84% on RA and place on supp O2. Unable to obtain proper history from pt due to confusion. Pt opens eyes to commands and denies pain.     In the ED, CXR showing cardiomegaly with b/l perihilar and basilar airspace consolidations. BNP 6133, D-Dimer 708, Lactate 2.1-->1.9 s/p IVF, Lasix 40 IV x1, CTX and Azt x1 dose, mildly elevated trops 0.058-->0.053  Pt in Afib w/ RVR 's, given Cardizem 10mg IV x1 dose, rate improved to 100's 89 y/o F PMHx significant for ASHD s/p PCI x1, b/l hearing loss, pancreatic mass, HLD, HTN, OA, and spinal stenosis brought in from [facility] due to SOB, and new onset afib. Pt found to be hypoxic at 84% on RA and place on supp O2. Unable to obtain proper history from pt due to confusion. Pt opens eyes to commands and denies pain. As per pt's daughter, pt's mental status has declined     In the ED, CXR showing cardiomegaly with b/l perihilar and basilar airspace consolidations. BNP 6133, D-Dimer 708, Lactate 2.1-->1.9 s/p IVF, Lasix 40 IV x1, CTX and Azt x1 dose, mildly elevated trops 0.058-->0.053  Pt in Afib w/ RVR 's, given Cardizem 10mg IV x1 dose, rate improved to 100's 89 y/o F PMHx significant for ASHD s/p PCI x1, b/l hearing loss, pancreatic mass, HLD, HTN, OA, and spinal stenosis brought in from [facility] due to SOB, and new onset afib. Pt found to be hypoxic at 84% on RA and place on supp O2. Unable to obtain proper history from pt due to confusion. Pt opens eyes to commands and denies pain. As per pt's daughter, pt's mental status has declined since a TIA in 2017, especially since the COVID pandemic as she has been largely isolated in her room; used to ambulate with walker but not as of recently. Recently got a 24 hour aide this week. Saw pt 2 weeks ago and noticed the increased b/l LE swelling. There has been no official diagnosis of dementia made but has been discussed with pt's PCP. Daughter is not aware if pt had fever or cough prior to ED arrival.     In the ED, CXR showing cardiomegaly with b/l perihilar and basilar airspace consolidations. BNP 6133, D-Dimer 708, Lactate 2.1-->1.9 s/p IVF, Lasix 40 IV x1, CTX and Azt x1 dose, mildly elevated trops 0.058-->0.053  Pt in Afib w/ RVR 's, given Cardizem 10mg IV x1 dose, rate improved to 100's

## 2020-06-17 NOTE — ED PROVIDER NOTE - OBJECTIVE STATEMENT
89 y/o female with PMHx of ASHD, HLD, HTN, OA, and spinal stenosis presents to the ED BIBEMS from [facility] assisted living for evaluation of new onset Afib and hypoxia. Pt endorses mild +SOB. Denies chest pain, fever, or cough. Allergic to codeine. PCP: Dr. Dionicio Mclean.

## 2020-06-17 NOTE — ED ADULT NURSE NOTE - NSFALLRSKINDICATORS_ED_ALL_ED
HISTORY OF PRESENT ILLNESS:  The patient is seen today 04/29/2017.  He is not in any cardiopulmonary 
distress.  This is a 50 years old  male with history of uncontrolled type 2 diabetes mellitu
s, severe peripheral vascular disease, status post left BKA, presented to Emergency Room on the day o
f admission because of abdominal pain that has been progressing over the last 4 days.  The patient st
ated that he has history of Crohn's disease in the past.  The patient was evaluated in the Emergency 
Room and he had blood work done that showed elevation of lipase as well as a CT scan that did not evelio
w significant abnormality except for diffuse bladder wall thickening.  Due to persistent abdominal pa
in, the patient was started on IV fluids and admitted for further management.



REVIEW OF SYSTEMS:  Other review of systems is negative.



ALLERGIES:  THE PATIENT HAS ALLERGY TO ACETAMINOPHEN, OXYCODONE AND PENICILLIN.



SOCIAL HISTORY:  Smoker for more than 25 years.  No ETOH or substance abuse.



FAMILY HISTORY:  Noncontributory.



PHYSICAL EXAMINATION:

GENERAL:  The patient is in bed, comfortable at the time of this examination.

VITAL SIGNS:  Blood pressure 113/69, temperature 97.4, respiratory rate 20 and pulse is 66.

HEENT:  Pupils equal, reactive to light.  Normal-appearing mucosa of the conjunctivae, oropharyngeal 
and nasal membrane mucosa.

NECK:  Supple, no JVD, no carotid bruit.  

CHEST AND LUNGS:  Bilateral symmetrical expansion, good air exchange, no rales, no rhonchi.

CARDIOVASCULAR:  PMI not localized.  S1, S2.  No additional sounds.

ABDOMEN:  Normoactive bowel sounds.  There is epigastric tenderness.  No organomegaly, no masses.

EXTREMITIES:  No cyanosis, no clubbing, no edema.  Left BKA.

CENTRAL NERVOUS SYSTEM:  Alert, awake, oriented x 3 and no neurological deficits except for what is b
ecause of the left BKA.  



ASSESSMENT:

1.  Acute pancreatitis.

2.  Chronic kidney disease with a GFR of 36.

3.  Uncontrolled type 2 diabetes mellitus.

4.  Anemia of chronic disease.



PLAN:  Start IV fluids.  Continue Accu-Cheks with insulin coverage.  Gastrointestinal consult and fol
low their recommendations.  Abdominal ultrasound.





__________________________________________

Liliane Brito MD







cc:



DD: 04/29/2017 10:00:49  [id number]

TT: 04/29/2017 12:03:41

Job # [id number]

cn
yes

## 2020-06-17 NOTE — H&P ADULT - NSICDXPASTMEDICALHX_GEN_ALL_CORE_FT
PAST MEDICAL HISTORY:  Arteriosclerotic heart disease (ASHD)     Deviated septum     Hypercholesterolemia     Hypertension, unspecified type     Osteoarthritis, unspecified osteoarthritis type, unspecified site     Pancreatic mass     PVCs (premature ventricular contractions)     Spinal stenosis, unspecified spinal region PAST MEDICAL HISTORY:  Arteriosclerotic heart disease (ASHD)     Deviated septum     Hypercholesterolemia     Hypertension, unspecified type     Osteoarthritis, unspecified osteoarthritis type, unspecified site     Pancreatic mass     PVCs (premature ventricular contractions)     Spinal stenosis, unspecified spinal region     Transient ischemic attack (TIA) 2017

## 2020-06-17 NOTE — H&P ADULT - ASSESSMENT
Admit to telemetry    #SOB/Hypoxia  -CXR  -s/p CTX and AZT for CAP coverage  -Supple O2  -s/p Lasix     #New Onset Afib  CHADSVASC: 5  -Recommend starting AC  -EKG  -Cardio consult    #CHF exacerbation  -Strict I&O's  -Weights  -Lasix  -ECHO    #Elevated Lactate  -2.1  -r/p 1.7    #Elevated D-Dimer  -D Dimer 708      #UMBERTO  -Baseline 0.9  -Cr 1.2 on admission  -s/p IVF  -f/u AM BMP    #Tropinemia  -0.058, r/p trop pending  -likely due to demand ischemia  -cardio consult    #DVT PPX  -Pt refused lovenox*  IMPROVE VTE Individual Risk Assessment    RISK                                                                Points    [  ] Previous VTE                                                  3    [ x  ] Thrombophilia                                               2    [  ] Lower limb paralysis                                      2        (unable to hold up >15 seconds)      [  ] Current Cancer                                              2         (within 6 months)    [  ] Immobilization > 24 hrs                                1    [  ] ICU/CCU stay > 24 hours                              1    [ x ] Age > 60                                                      1    IMPROVE VTE Score ____3_____    IMPROVE Score 0-1: Low Risk, No VTE prophylaxis required for most patients, encourage ambulation.   IMPROVE Score 2-3: At risk, pharmacologic VTE prophylaxis is indicated for most patients (in the absence of a contraindication)  IMPROVE Score > or = 4: High Risk, pharmacologic VTE prophylaxis is indicated for most patients (in the absence of a contraindication)    #Adv Directives 89 y/o F PMHx significant for ASHD s/p PCI x1, b/l hearing loss, pancreatic mass, HLD, HTN, OA, and spinal stenosis brought in from [facility] due to SOB, and new onset afib.    Admit to telemetry    #New Onset Afib  CHADSVASC: 5  -Recommend starting Eliquis 5mg BID  -EKG showing afib w/ RVR Hr 130's  -Rate improved s/p cardizem 10mg IV x 1 dose  -Cont rate control with low dose metoprolol 12.5mg BID w/ parameters  -f/u TSH  -Cardio consult    #acute CHF exacerbation  in the setting of new onset Afib w/ RVR  -s/p lasix 40mg IV x1 in ED  -Strict I&O's  -Weights  -Continue lasix 40mg IV BID  -Closely monitor renal function  -ECHO ordered  -Cardio consult    #SOB/Hypoxia, concern for underlying PNA  -CXR as noted above  -Lactate 2.1-->1.9  -SpO2 97% on 4LNC  -pt is afebrile w/o leukocytosis   -s/p CTX 1g IV x1 and azithromycin 500mg PO for CAP coverage  -f/u Blood/Urine cx  -COVID pending  -f/u procalcitonin  -CT chest    #Elevated D-Dimer  -D Dimer 708  -US LE doppler ordered to r/o DVT    #UMBERTO  -Baseline 0.9  -Cr 1.25 on admission  -Monitor closely while on lasix  -avoid nephrotoxic agents  -f/u AM BMP    #Tropinemia  -0.058-->0.053  -likely due to demand ischemia  -cardio consult    #DVT PPX  -AC w/ Eliquis 5mg BID  IMPROVE VTE Individual Risk Assessment    RISK                                                                Points    [  ] Previous VTE                                                  3    [ x  ] Thrombophilia                                               2    [  ] Lower limb paralysis                                      2        (unable to hold up >15 seconds)      [  ] Current Cancer                                              2         (within 6 months)    [  ] Immobilization > 24 hrs                                1    [  ] ICU/CCU stay > 24 hours                              1    [ x ] Age > 60                                                      1    IMPROVE VTE Score ____3_____    IMPROVE Score 0-1: Low Risk, No VTE prophylaxis required for most patients, encourage ambulation.   IMPROVE Score 2-3: At risk, pharmacologic VTE prophylaxis is indicated for most patients (in the absence of a contraindication)  IMPROVE Score > or = 4: High Risk, pharmacologic VTE prophylaxis is indicated for most patients (in the absence of a contraindication)    #Adv Directives  - 89 y/o F PMHx significant for ASHD s/p PCI x1, b/l hearing loss, pancreatic mass, HLD, HTN, OA, and spinal stenosis brought in from [facility] due to SOB, and new onset afib.    Admit to telemetry    #New Onset Afib  CHADSVASC: 5  -Eliquis 2.5mg BID, adjusted for age and weight  -EKG showing afib w/ RVR Hr 130's  -Rate improved s/p cardizem 10mg IV x 1 dose  -Cont w/ metoprolol succinate 50mg w/ holding parameters  -f/u TSH  -Cardio consult    #Acute CHF exacerbation  in the setting of new onset Afib w/ RVR  -CXR significant for cardiomegaly, concern for pulmonary edema   -proBNP 6133  -s/p lasix 40mg IV x1 in ED  -Strict I&O's  -Weights  -Continue lasix 40mg IV BID  -Closely monitor renal function  -ECHO ordered  -Cardio consult    #SOB/Hypoxia, concern for underlying PNA  -CXR notable for perihilar and basilar interstitial opacities R>L  -Lactate 2.1-->1.9  -SpO2 97% on 4LNC  -pt is afebrile w/o leukocytosis   -s/p CTX 1g IV x1 and azithromycin 500mg PO for CAP coverage  -f/u Blood/Urine cx  -COVID pending  -f/u procalcitonin  -CT chest    #Elevated D-Dimer  -D Dimer 708  -US LE doppler ordered to r/o DVT    #UMBERTO  -Baseline 0.9  -Cr 1.25 on admission  -Monitor closely while on lasix  -avoid nephrotoxic agents  -f/u AM BMP    #Tropinemia  -0.058-->0.053  -likely due to demand ischemia  -cardio consult    #DVT PPX  -AC w/ Eliquis 5mg BID  IMPROVE VTE Individual Risk Assessment    RISK                                                                Points    [  ] Previous VTE                                                  3    [ x  ] Thrombophilia                                               2    [  ] Lower limb paralysis                                      2        (unable to hold up >15 seconds)      [  ] Current Cancer                                              2         (within 6 months)    [  ] Immobilization > 24 hrs                                1    [  ] ICU/CCU stay > 24 hours                              1    [ x ] Age > 60                                                      1    IMPROVE VTE Score ____3_____    IMPROVE Score 0-1: Low Risk, No VTE prophylaxis required for most patients, encourage ambulation.   IMPROVE Score 2-3: At risk, pharmacologic VTE prophylaxis is indicated for most patients (in the absence of a contraindication)  IMPROVE Score > or = 4: High Risk, pharmacologic VTE prophylaxis is indicated for most patients (in the absence of a contraindication)    #Adv Directives  - 89 y/o F PMHx significant for ASHD s/p PCI x1, b/l hearing loss, pancreatic mass, HLD, HTN, OA, and spinal stenosis brought in from [facility] due to SOB, and new onset afib.    Admit to telemetry    #New Onset Afib  CHADSVASC: 5  -Eliquis 2.5mg BID, adjusted for age and weight  -EKG showing afib w/ RVR Hr 130's  -Rate improved s/p cardizem 10mg IV x 1 dose  -Cont w/ metoprolol succinate 50mg w/ holding parameters  -f/u TSH  -Cardio consult- Dr. Campbell    #Acute CHF exacerbation  in the setting of new onset Afib w/ RVR  -CXR significant for cardiomegaly, concern for pulmonary edema   -proBNP 6133  -s/p lasix 40mg IV x1 in ED  -Strict I&O's  -Weights  -Continue lasix 40mg IV BID  -Closely monitor renal function  -ECHO ordered  -Cardio consulted    #SOB/Hypoxia, concern for underlying PNA  -CXR notable for perihilar and basilar interstitial opacities R>L  -Lactate 2.1-->1.9  -SpO2 97% on 4LNC  -pt is afebrile w/o leukocytosis   -s/p CTX 1g IV x1 and azithromycin 500mg PO for CAP coverage  -Cont CTX 1g q24h and azithromycin 500mg PO q24h  -f/u Blood/Urine cx  -COVID pending  -f/u procalcitonin    #Elevated D-Dimer  -D Dimer 708  -US LE doppler-notable for chronic dvt distal L femoral vein and acute L posterior tibial vein  -s/p lovenox 60mg IM x1 in ED  -AC w/ Eliquis 2.5mg BID    #UMBERTO  -Baseline 0.9  -Cr 1.25 on admission  -Monitor closely while on lasix  -avoid nephrotoxic agents  -f/u AM BMP    #Tropinemia  -0.058-->0.053  -likely due to demand ischemia  -cardio consult    #DVT PPX  -AC w/ Eliquis 2.5 mg BID  IMPROVE VTE Individual Risk Assessment    RISK                                                                Points    [  ] Previous VTE                                                  3    [   ] Thrombophilia                                               2    [  ] Lower limb paralysis                                      2        (unable to hold up >15 seconds)      [  ] Current Cancer                                              2         (within 6 months)    [ x ] Immobilization > 24 hrs                                1    [  ] ICU/CCU stay > 24 hours                              1    [ x ] Age > 60                                                      1    IMPROVE VTE Score __2___    IMPROVE Score 0-1: Low Risk, No VTE prophylaxis required for most patients, encourage ambulation.   IMPROVE Score 2-3: At risk, pharmacologic VTE prophylaxis is indicated for most patients (in the absence of a contraindication)  IMPROVE Score > or = 4: High Risk, pharmacologic VTE prophylaxis is indicated for most patients (in the absence of a contraindication)    #Adv Directives  -Spoke with daughter who is POA, Pt DNR/DNI, MOLST filled and signed

## 2020-06-18 DIAGNOSIS — I80.10 PHLEBITIS AND THROMBOPHLEBITIS OF UNSPECIFIED FEMORAL VEIN: ICD-10-CM

## 2020-06-18 DIAGNOSIS — I48.91 UNSPECIFIED ATRIAL FIBRILLATION: ICD-10-CM

## 2020-06-18 DIAGNOSIS — Z95.5 PRESENCE OF CORONARY ANGIOPLASTY IMPLANT AND GRAFT: Chronic | ICD-10-CM

## 2020-06-18 DIAGNOSIS — R09.89 OTHER SPECIFIED SYMPTOMS AND SIGNS INVOLVING THE CIRCULATORY AND RESPIRATORY SYSTEMS: ICD-10-CM

## 2020-06-18 LAB
ANION GAP SERPL CALC-SCNC: 7 MMOL/L — SIGNIFICANT CHANGE UP (ref 5–17)
BASOPHILS # BLD AUTO: 0.04 K/UL — SIGNIFICANT CHANGE UP (ref 0–0.2)
BASOPHILS NFR BLD AUTO: 0.8 % — SIGNIFICANT CHANGE UP (ref 0–2)
BUN SERPL-MCNC: 32 MG/DL — HIGH (ref 7–23)
CALCIUM SERPL-MCNC: 9.5 MG/DL — SIGNIFICANT CHANGE UP (ref 8.5–10.1)
CHLORIDE SERPL-SCNC: 105 MMOL/L — SIGNIFICANT CHANGE UP (ref 96–108)
CO2 SERPL-SCNC: 28 MMOL/L — SIGNIFICANT CHANGE UP (ref 22–31)
CREAT SERPL-MCNC: 1.31 MG/DL — HIGH (ref 0.5–1.3)
EOSINOPHIL # BLD AUTO: 0.18 K/UL — SIGNIFICANT CHANGE UP (ref 0–0.5)
EOSINOPHIL NFR BLD AUTO: 3.5 % — SIGNIFICANT CHANGE UP (ref 0–6)
GLUCOSE SERPL-MCNC: 110 MG/DL — HIGH (ref 70–99)
HCT VFR BLD CALC: 45.8 % — HIGH (ref 34.5–45)
HGB BLD-MCNC: 14.2 G/DL — SIGNIFICANT CHANGE UP (ref 11.5–15.5)
IMM GRANULOCYTES NFR BLD AUTO: 0.2 % — SIGNIFICANT CHANGE UP (ref 0–1.5)
LYMPHOCYTES # BLD AUTO: 1.06 K/UL — SIGNIFICANT CHANGE UP (ref 1–3.3)
LYMPHOCYTES # BLD AUTO: 20.5 % — SIGNIFICANT CHANGE UP (ref 13–44)
MAGNESIUM SERPL-MCNC: 2.2 MG/DL — SIGNIFICANT CHANGE UP (ref 1.6–2.6)
MCHC RBC-ENTMCNC: 29.4 PG — SIGNIFICANT CHANGE UP (ref 27–34)
MCHC RBC-ENTMCNC: 31 GM/DL — LOW (ref 32–36)
MCV RBC AUTO: 94.8 FL — SIGNIFICANT CHANGE UP (ref 80–100)
MONOCYTES # BLD AUTO: 0.84 K/UL — SIGNIFICANT CHANGE UP (ref 0–0.9)
MONOCYTES NFR BLD AUTO: 16.2 % — HIGH (ref 2–14)
NEUTROPHILS # BLD AUTO: 3.04 K/UL — SIGNIFICANT CHANGE UP (ref 1.8–7.4)
NEUTROPHILS NFR BLD AUTO: 58.8 % — SIGNIFICANT CHANGE UP (ref 43–77)
PHOSPHATE SERPL-MCNC: 3.5 MG/DL — SIGNIFICANT CHANGE UP (ref 2.5–4.5)
PLATELET # BLD AUTO: 162 K/UL — SIGNIFICANT CHANGE UP (ref 150–400)
POTASSIUM SERPL-MCNC: 4.3 MMOL/L — SIGNIFICANT CHANGE UP (ref 3.5–5.3)
POTASSIUM SERPL-SCNC: 4.3 MMOL/L — SIGNIFICANT CHANGE UP (ref 3.5–5.3)
PROCALCITONIN SERPL-MCNC: 0.11 NG/ML — HIGH (ref 0.02–0.1)
RBC # BLD: 4.83 M/UL — SIGNIFICANT CHANGE UP (ref 3.8–5.2)
RBC # FLD: 16.3 % — HIGH (ref 10.3–14.5)
SARS-COV-2 IGG SERPL QL IA: POSITIVE
SARS-COV-2 IGM SERPL IA-ACNC: 6.26 INDEX — HIGH
SARS-COV-2 RNA SPEC QL NAA+PROBE: SIGNIFICANT CHANGE UP
SODIUM SERPL-SCNC: 140 MMOL/L — SIGNIFICANT CHANGE UP (ref 135–145)
TROPONIN I SERPL-MCNC: 0.07 NG/ML — HIGH (ref 0.01–0.04)
TSH SERPL-MCNC: 1.1 UU/ML — SIGNIFICANT CHANGE UP (ref 0.34–4.82)
WBC # BLD: 5.17 K/UL — SIGNIFICANT CHANGE UP (ref 3.8–10.5)
WBC # FLD AUTO: 5.17 K/UL — SIGNIFICANT CHANGE UP (ref 3.8–10.5)

## 2020-06-18 PROCEDURE — 85025 COMPLETE CBC W/AUTO DIFF WBC: CPT

## 2020-06-18 PROCEDURE — 93010 ELECTROCARDIOGRAM REPORT: CPT

## 2020-06-18 PROCEDURE — 86769 SARS-COV-2 COVID-19 ANTIBODY: CPT

## 2020-06-18 PROCEDURE — 93306 TTE W/DOPPLER COMPLETE: CPT | Mod: 26

## 2020-06-18 PROCEDURE — 97162 PT EVAL MOD COMPLEX 30 MIN: CPT | Mod: GP

## 2020-06-18 PROCEDURE — 93306 TTE W/DOPPLER COMPLETE: CPT

## 2020-06-18 PROCEDURE — 99233 SBSQ HOSP IP/OBS HIGH 50: CPT

## 2020-06-18 PROCEDURE — 97530 THERAPEUTIC ACTIVITIES: CPT | Mod: GP

## 2020-06-18 PROCEDURE — 84100 ASSAY OF PHOSPHORUS: CPT

## 2020-06-18 PROCEDURE — 80048 BASIC METABOLIC PNL TOTAL CA: CPT

## 2020-06-18 PROCEDURE — U0003: CPT

## 2020-06-18 PROCEDURE — 82272 OCCULT BLD FECES 1-3 TESTS: CPT

## 2020-06-18 PROCEDURE — 36415 COLL VENOUS BLD VENIPUNCTURE: CPT

## 2020-06-18 PROCEDURE — 93970 EXTREMITY STUDY: CPT | Mod: 26

## 2020-06-18 PROCEDURE — 83735 ASSAY OF MAGNESIUM: CPT

## 2020-06-18 PROCEDURE — 85027 COMPLETE CBC AUTOMATED: CPT

## 2020-06-18 PROCEDURE — 93005 ELECTROCARDIOGRAM TRACING: CPT

## 2020-06-18 PROCEDURE — 71045 X-RAY EXAM CHEST 1 VIEW: CPT

## 2020-06-18 PROCEDURE — 84145 PROCALCITONIN (PCT): CPT

## 2020-06-18 PROCEDURE — 71275 CT ANGIOGRAPHY CHEST: CPT

## 2020-06-18 PROCEDURE — 97116 GAIT TRAINING THERAPY: CPT | Mod: GP

## 2020-06-18 PROCEDURE — 71275 CT ANGIOGRAPHY CHEST: CPT | Mod: 26

## 2020-06-18 PROCEDURE — 84443 ASSAY THYROID STIM HORMONE: CPT

## 2020-06-18 RX ORDER — ATORVASTATIN CALCIUM 80 MG/1
1 TABLET, FILM COATED ORAL
Qty: 0 | Refills: 0 | DISCHARGE

## 2020-06-18 RX ORDER — CEFTRIAXONE 500 MG/1
1000 INJECTION, POWDER, FOR SOLUTION INTRAMUSCULAR; INTRAVENOUS EVERY 24 HOURS
Refills: 0 | Status: DISCONTINUED | OUTPATIENT
Start: 2020-06-18 | End: 2020-06-18

## 2020-06-18 RX ORDER — DILTIAZEM HCL 120 MG
5 CAPSULE, EXT RELEASE 24 HR ORAL
Qty: 125 | Refills: 0 | Status: DISCONTINUED | OUTPATIENT
Start: 2020-06-18 | End: 2020-06-19

## 2020-06-18 RX ORDER — APIXABAN 2.5 MG/1
2.5 TABLET, FILM COATED ORAL EVERY 12 HOURS
Refills: 0 | Status: DISCONTINUED | OUTPATIENT
Start: 2020-06-18 | End: 2020-06-18

## 2020-06-18 RX ORDER — METOPROLOL TARTRATE 50 MG
50 TABLET ORAL DAILY
Refills: 0 | Status: DISCONTINUED | OUTPATIENT
Start: 2020-06-18 | End: 2020-06-22

## 2020-06-18 RX ORDER — AZITHROMYCIN 500 MG/1
500 TABLET, FILM COATED ORAL DAILY
Refills: 0 | Status: DISCONTINUED | OUTPATIENT
Start: 2020-06-18 | End: 2020-06-22

## 2020-06-18 RX ORDER — APIXABAN 2.5 MG/1
10 TABLET, FILM COATED ORAL EVERY 12 HOURS
Refills: 0 | Status: COMPLETED | OUTPATIENT
Start: 2020-06-18 | End: 2020-06-24

## 2020-06-18 RX ORDER — CEFTRIAXONE 500 MG/1
1000 INJECTION, POWDER, FOR SOLUTION INTRAMUSCULAR; INTRAVENOUS EVERY 24 HOURS
Refills: 0 | Status: COMPLETED | OUTPATIENT
Start: 2020-06-18 | End: 2020-06-22

## 2020-06-18 RX ORDER — FUROSEMIDE 40 MG
40 TABLET ORAL
Refills: 0 | Status: DISCONTINUED | OUTPATIENT
Start: 2020-06-18 | End: 2020-06-20

## 2020-06-18 RX ORDER — APIXABAN 2.5 MG/1
10 TABLET, FILM COATED ORAL ONCE
Refills: 0 | Status: DISCONTINUED | OUTPATIENT
Start: 2020-06-18 | End: 2020-06-18

## 2020-06-18 RX ORDER — OLANZAPINE 15 MG/1
2.5 TABLET, FILM COATED ORAL ONCE
Refills: 0 | Status: DISCONTINUED | OUTPATIENT
Start: 2020-06-18 | End: 2020-06-19

## 2020-06-18 RX ORDER — ATORVASTATIN CALCIUM 80 MG/1
10 TABLET, FILM COATED ORAL AT BEDTIME
Refills: 0 | Status: DISCONTINUED | OUTPATIENT
Start: 2020-06-18 | End: 2020-06-26

## 2020-06-18 RX ADMIN — Medication 40 MILLIGRAM(S): at 18:44

## 2020-06-18 RX ADMIN — AZITHROMYCIN 500 MILLIGRAM(S): 500 TABLET, FILM COATED ORAL at 21:33

## 2020-06-18 RX ADMIN — Medication 40 MILLIGRAM(S): at 10:30

## 2020-06-18 RX ADMIN — Medication 5 MG/HR: at 11:52

## 2020-06-18 RX ADMIN — ATORVASTATIN CALCIUM 10 MILLIGRAM(S): 80 TABLET, FILM COATED ORAL at 21:33

## 2020-06-18 RX ADMIN — CEFTRIAXONE 1000 MILLIGRAM(S): 500 INJECTION, POWDER, FOR SOLUTION INTRAMUSCULAR; INTRAVENOUS at 20:54

## 2020-06-18 RX ADMIN — Medication 50 MILLIGRAM(S): at 10:31

## 2020-06-18 RX ADMIN — APIXABAN 10 MILLIGRAM(S): 2.5 TABLET, FILM COATED ORAL at 22:11

## 2020-06-18 RX ADMIN — ENOXAPARIN SODIUM 60 MILLIGRAM(S): 100 INJECTION SUBCUTANEOUS at 00:26

## 2020-06-18 RX ADMIN — APIXABAN 10 MILLIGRAM(S): 2.5 TABLET, FILM COATED ORAL at 11:53

## 2020-06-18 NOTE — PROGRESS NOTE ADULT - ASSESSMENT
A/P: 89 y/o F PMHx significant for ASHD s/p PCI x1, b/l hearing loss, pancreatic mass, HLD, HTN, OA, and spinal stenosis brought in from [facility] due to SOB, and new onset afib.    1.	Acute respiratory failure w/ hypoxia, likely multifactorial due to acute PE, CHF exac and concern for GNR PNA  2.	Acute RLL PE w/ R heart strain  3.	acute LLE DVT/ chronic LLE DVT   4.	New Onset Afib  5.	Acute on chronic systolic CHF, EF 30-35%  6.	severe pulmonary HTN  7.	Elevated troponin  8.	elevated creat, doesn't meet criteria for UMBERTO at this time    -CHADSVASC: 5, started on Eliquis (higher dose due to acute dvt)  -Cardizem drip  -Cont w/ metoprolol succinate 50mg w/ holding parameters  -nml TSH  -echo findings as above  -IV lasix BID, Strict I&O's, daily Weights  -CXR notable for perihilar and basilar interstitial opacities R>L  -Lactate 2.1-->1.9  -SpO2 was 97% on 4LNC, wean off O2 as nelly  -pt is afebrile w/o leukocytosis   -cont empiric ceftriaxone and azithromycin  -f/u Blood/Urine cx  -COVID neg, antibx testing pending  -procalcitonin slightly elevated at 0.11  -Monitor renal function closely while on lasix, avoid nephrotoxic agents  -f/u AM BMP  -elevated trop likely due to demand ischemia  -cardio consult appreciated    ADVANCE DIRECTIVES/ CODE STATUS: DNR/DNI - MOLST on chart    DISPO: inpatient. tele monitoring. guarded prognosis. called etelvina, no answer, left msg to call me back. d/w pt's other daughter, Kalani [phone].

## 2020-06-18 NOTE — CONSULT NOTE ADULT - ASSESSMENT
89 y/o F PMHx significant for ASHD s/p PCI x1, b/l hearing loss, pancreatic mass, HLD, HTN, OA, and spinal stenosis brought in from [facility] due to SOB, and new onset afib.    Admit to telemetry    #New Onset Afib  CHADSVASC: 5  -Eliquis 2.5mg BID, adjusted for age and weight  -EKG showing afib w/ RVR Hr 130's  -Rate improved s/p cardizem 10mg IV x 1 dose  -Cont w/ metoprolol succinate 50mg w/ holding parameters  -f/u TSH  -start IV Cardizem    #Acute CHF exacerbation  in the setting of new onset Afib w/ RVR  -CXR significant for cardiomegaly, concern for pulmonary edema   -proBNP 6133  -s/p lasix 40mg IV x1 in ED  -Strict I&O's  -Weights  -Continue lasix 40mg IV BID  -Closely monitor renal function  -ECHO ordered  -Cardio consulted  #SOB/Hypoxia, concern for underlying PNA  -CXR notable for perihilar and basilar interstitial opacities R>L  -Lactate 2.1-->1.9  -SpO2 97% on 4LNC  -pt is afebrile w/o leukocytosis   -s/p CTX 1g IV x1 and azithromycin 500mg PO for CAP coverage  -Cont CTX 1g q24h and azithromycin 500mg PO q24h  -f/u Blood/Urine cx  -COVID pending  -f/u procalcitonin    #Elevated D-Dimer  -D Dimer 708  -US LE doppler-notable for chronic dvt distal L femoral vein and acute L posterior tibial vein  -s/p lovenox 60mg IM x1 in ED  -AC w/ Eliquis 2.5mg BID    #UMBERTO  -Baseline 0.9  -Cr 1.25 on admission  -Monitor closely while on lasix  -avoid nephrotoxic agents  -f/u AM BMP    #Tropinemia  -0.058-->0.053  -likely due to demand ischemia  -echo    #DVT PPX  -AC w/ Eliquis 2.5 mg BID  IMPROVE VTE Individual Risk Assessment

## 2020-06-18 NOTE — PROVIDER CONTACT NOTE (CRITICAL VALUE NOTIFICATION) - ASSESSMENT
Pt A&Ox1. Pt on 5L O2 saturation 91%. Afib on monitor - . LE dopplers completed. Pt + acute L post. tib. DVT, chronic L fem. DVT. Dr. Valdez and Hawk made aware. Dr. Valdez at bedside.

## 2020-06-18 NOTE — CONSULT NOTE ADULT - SUBJECTIVE AND OBJECTIVE BOX
CHIEF COMPLAINT:    HPI:  89 y/o F PMHx significant for ASHD s/p PCI x1, b/l hearing loss, pancreatic mass, HLD, HTN, OA, and spinal stenosis brought in from [facility] due to SOB, and new onset afib. Pt found to be hypoxic at 84% on RA and place on supp O2. Unable to obtain proper history from pt due to confusion. Pt opens eyes to commands and denies pain. As per pt's daughter, pt's mental status has declined since a TIA in 2017, especially since the COVID pandemic as she has been largely isolated in her room; used to ambulate with walker but not as of recently. Recently got a 24 hour aide this week. Saw pt 2 weeks ago and noticed the increased b/l LE swelling. There has been no official diagnosis of dementia made but has been discussed with pt's PCP. Daughter is not aware if pt had fever or cough prior to ED arrival.     In the ED, CXR showing cardiomegaly with b/l perihilar and basilar airspace consolidations. BNP 6133, D-Dimer 708, Lactate 2.1-->1.9 s/p IVF, Lasix 40 IV x1, CTX and Azt x1 dose, mildly elevated trops 0.058-->0.053  Pt in Afib w/ RVR 's, given Cardizem 10mg IV x1 dose, rate improved to 100's (17 Jun 2020 22:52)    I have not seen her in office since 2016. Venous Doppler showed L DVT. Pt started on Eliquis. Trop mildly elevated, due to demand ischemia. No EKG available. AF on monitor with -120.       PAST MEDICAL & SURGICAL HISTORY:  Transient ischemic attack (TIA): 2017  Hypercholesterolemia  Hypertension, unspecified type  PVCs (premature ventricular contractions)  Deviated septum  Pancreatic mass  Arteriosclerotic heart disease (ASHD)  Spinal stenosis, unspecified spinal region  Osteoarthritis, unspecified osteoarthritis type, unspecified site  History of coronary artery stent placement: approx 2005      Allergies    codeine (Other)    Intolerances        Occupation:  Alochol: Denied  Smoking: Denied  Drug Use: Denied  Marital Status:         FAMILY HISTORY:  No pertinent family history in first degree relatives      REVIEW OF SYSTEMS:    CONSTITUTIONAL: No weakness, fevers or chills  EYES/ENT: No visual changes;  No vertigo or throat pain   NECK: No pain or stiffness  RESPIRATORY: No cough, wheezing, hemoptysis; No shortness of breath  CARDIOVASCULAR: No chest pain or palpitations  GASTROINTESTINAL: No abdominal or epigastric pain. No nausea, vomiting, or hematemesis; No diarrhea or constipation. No melena or hematochezia.  GENITOURINARY: No dysuria, frequency or hematuria  NEUROLOGICAL: No numbness or weakness  SKIN: No itching, burning, rashes, or lesions   All other review of systems is negative unless indicated above    Vital Signs Last 24 Hrs  T(C): 36.7 (18 Jun 2020 02:21), Max: 36.9 (17 Jun 2020 18:00)  T(F): 98.1 (18 Jun 2020 02:21), Max: 98.5 (17 Jun 2020 18:00)  HR: 113 (18 Jun 2020 02:21) (68 - 132)  BP: 124/100 (18 Jun 2020 02:21) (102/65 - 136/99)  BP(mean): --  RR: 18 (18 Jun 2020 02:21) (16 - 18)  SpO2: 100% (18 Jun 2020 02:21) (84% - 100%)    I&O's Summary      PHYSICAL EXAM:    Constitutional: NAD, awake and alert, well-developed  HEENT: PERR, EOMI,  No oral cyananosis.  Neck:  supple,  No JVD  Respiratory: Breath sounds are clear bilaterally, No wheezing, rales or rhonchi  Cardiovascular: S1 and S2, regular rate and rhythm, no Murmurs, gallops or rubs  Gastrointestinal: Bowel Sounds present, soft, nontender.   Extremities: No peripheral edema. No clubbing or cyanosis.  Vascular: 2+ peripheral pulses  Neurological: A/O x 3, no focal deficits  Musculoskeletal: no calf tenderness.  Skin: No rashes.    MEDICATIONS:  MEDICATIONS  (STANDING):  atorvastatin 10 milliGRAM(s) Oral at bedtime  azithromycin   Tablet 500 milliGRAM(s) Oral daily  cefTRIAXone Injectable. 1000 milliGRAM(s) IV Push every 24 hours  furosemide   Injectable 40 milliGRAM(s) IV Push <User Schedule>  metoprolol succinate ER 50 milliGRAM(s) Oral daily      LABS: All Labs Reviewed:                        14.2   5.17  )-----------( 162      ( 18 Jun 2020 07:32 )             45.8                         14.0   5.60  )-----------( 196      ( 17 Jun 2020 18:20 )             45.9     18 Jun 2020 07:32    140    |  105    |  32     ----------------------------<  110    4.3     |  28     |  1.31   17 Jun 2020 18:20    138    |  106    |  31     ----------------------------<  129    4.3     |  25     |  1.25     Ca    9.5        18 Jun 2020 07:32  Ca    9.4        17 Jun 2020 18:20  Phos  3.5       18 Jun 2020 07:32  Mg     2.2       18 Jun 2020 07:32    TPro  7.1    /  Alb  2.9    /  TBili  1.0    /  DBili  x      /  AST  38     /  ALT  29     /  AlkPhos  139    17 Jun 2020 18:20    PT/INR - ( 17 Jun 2020 18:20 )   PT: 12.4 sec;   INR: 1.11 ratio         PTT - ( 17 Jun 2020 18:20 )  PTT:29.9 sec  CARDIAC MARKERS ( 18 Jun 2020 02:54 )  0.071 ng/mL / x     / x     / x     / x      CARDIAC MARKERS ( 17 Jun 2020 22:56 )  0.053 ng/mL / x     / x     / x     / x      CARDIAC MARKERS ( 17 Jun 2020 18:20 )  0.058 ng/mL / x     / x     / x     / x          Blood Culture:   06-17 @ 18:20  Pro Bnp 6133    06-18 @ 07:32  TSH: 1.10      RADIOLOGY/EKG:

## 2020-06-18 NOTE — PROGRESS NOTE ADULT - SUBJECTIVE AND OBJECTIVE BOX
CC/reason for follow up: afib, dvt    S: c/o uncomfortable bed mattress. denies any dyspnea.     ROS: no chest pain, no dyspnea    T(C): 36.6 (06-18-20 @ 11:03), Max: 36.9 (06-17-20 @ 18:00)  HR: 105 (06-18-20 @ 11:03) (68 - 132)  BP: 108/65 (06-18-20 @ 11:03) (102/65 - 136/99)  RR: 17 (06-18-20 @ 11:03) (16 - 18)  SpO2: 100% (06-18-20 @ 02:21) (84% - 100%)    Gen - resting in bed, responds to questions, cooperative, in no acute distress but chronically ill appearing  HEENT- PERRL, moist mucus membranes, OP clear  CV - IRIR, +murmur, No r/g, +pulses, mild b/l LE edema  Lungs - Good effort, Clear to auscultation bilaterally  back- kyphotic  Abdomen - Soft, nontender, nondistended, +BS, No rebound/rigidity/guarding  Ext - No cyanosis/clubbing.   Skin - No rashes, No jaundice  Psych- Alert & oriented to person and place only  Neuro- fluent speech, no facial droop, EOMI. moves all ext    MEDICATIONS  (STANDING):  apixaban 10 milliGRAM(s) Oral every 12 hours  atorvastatin 10 milliGRAM(s) Oral at bedtime  azithromycin   Tablet 500 milliGRAM(s) Oral daily  cefTRIAXone Injectable. 1000 milliGRAM(s) IV Push every 24 hours  diltiazem Infusion 5 mG/Hr (5 mL/Hr) IV Continuous <Continuous>  furosemide   Injectable 40 milliGRAM(s) IV Push <User Schedule>  metoprolol succinate ER 50 milliGRAM(s) Oral daily    MEDICATIONS  (PRN):  OLANZapine Injectable 2.5 milliGRAM(s) IntraMuscular once PRN agitation      Diagnostic studies: personally reviewed  LABS: All Labs Reviewed:                        14.2   5.17  )-----------( 162      ( 18 Jun 2020 07:32 )             45.8     06-18    140  |  105  |  32<H>  ----------------------------<  110<H>  4.3   |  28  |  1.31<H>    Ca    9.5      18 Jun 2020 07:32  Phos  3.5     06-18  Mg     2.2     06-18    TPro  7.1  /  Alb  2.9<L>  /  TBili  1.0  /  DBili  x   /  AST  38<H>  /  ALT  29  /  AlkPhos  139<H>  06-17    PT/INR - ( 17 Jun 2020 18:20 )   PT: 12.4 sec;   INR: 1.11 ratio         PTT - ( 17 Jun 2020 18:20 )  PTT:29.9 sec  CARDIAC MARKERS ( 18 Jun 2020 02:54 )  0.071 ng/mL / x     / x     / x     / x      CARDIAC MARKERS ( 17 Jun 2020 22:56 )  0.053 ng/mL / x     / x     / x     / x      CARDIAC MARKERS ( 17 Jun 2020 18:20 )  0.058 ng/mL / x     / x     / x     / x            Blood Culture:   RADIOLOGY/EKG:  < from: CT Head No Cont (12.16.19 @ 14:13) >  IMPRESSION:   Moderate periventricular white matter ischemia. Global   atrophy.    < end of copied text >  < from: CT Cervical Spine No Cont (01.10.19 @ 02:44) >  IMPRESSION:    No acute cervical spine fracture or evidence of traumatic malalignment.   Chronic compression fracture of T3.    Multilevel cervical spondylosis.    < end of copied text >    < from: TTE Echo Complete w/o Contrast w/ Doppler (06.18.20 @ 10:23) >   Summary     Fibrocalcific changes noted to the mitral valve leaflets with preserved   leaflet excursion.   Moderate to severe (3+) mitral regurgitation is present.   There are fibrocalcific changes noted to the aortic valve leaflets with   restriction in leaflet excursion. Transaortic gradients are underestimated   due to impaired left ventricle systolic function. At least mild aortic   stenosis is present. Mild aortic insufficiency noted.   The tricuspid valve leaflets are thin and pliable; valve motion is normal.   Moderate (2+) tricuspid valve regurgitation is present.   Severe pulmonary hypertension.   Normal appearing pulmonic valve structure.   Mild to moderate pulmonic valvular regurgitation is present.   The left atrium is severely dilated.   Mild concentric left ventricular hypertrophy is present.   Estimated left ventricular ejection fraction is 30-35 %.   The right atrium appears mildly dilated.   The right ventricle exhibits mild dilation, mild diffuse hypokinesis, and   mild depression of contractility.   IVC is at the upper limits of normal and not collapsing with inspiration.   Pleural effusion - is present..   A small to moderate pericardial effusion is present.    < end of copied text >    < from: US Duplex Venous Lower Ext Complete, Bilateral (06.18.20 @ 03:34) >  1. Acute deep venous thrombosis in the left posterior tibial vein. Chronic deep venous thrombosis in the distal left femoral vein.  2. No evidence of a deep venous thrombosis in the right lower extremity. Nonvisualization of the right popliteal vein.    < end of copied text >

## 2020-06-19 LAB
ANION GAP SERPL CALC-SCNC: 9 MMOL/L — SIGNIFICANT CHANGE UP (ref 5–17)
BUN SERPL-MCNC: 31 MG/DL — HIGH (ref 7–23)
CALCIUM SERPL-MCNC: 9.3 MG/DL — SIGNIFICANT CHANGE UP (ref 8.5–10.1)
CHLORIDE SERPL-SCNC: 104 MMOL/L — SIGNIFICANT CHANGE UP (ref 96–108)
CO2 SERPL-SCNC: 23 MMOL/L — SIGNIFICANT CHANGE UP (ref 22–31)
CREAT SERPL-MCNC: 1.18 MG/DL — SIGNIFICANT CHANGE UP (ref 0.5–1.3)
CULTURE RESULTS: SIGNIFICANT CHANGE UP
GLUCOSE SERPL-MCNC: 111 MG/DL — HIGH (ref 70–99)
MAGNESIUM SERPL-MCNC: 2.1 MG/DL — SIGNIFICANT CHANGE UP (ref 1.6–2.6)
POTASSIUM SERPL-MCNC: 3.8 MMOL/L — SIGNIFICANT CHANGE UP (ref 3.5–5.3)
POTASSIUM SERPL-SCNC: 3.8 MMOL/L — SIGNIFICANT CHANGE UP (ref 3.5–5.3)
SODIUM SERPL-SCNC: 136 MMOL/L — SIGNIFICANT CHANGE UP (ref 135–145)
SPECIMEN SOURCE: SIGNIFICANT CHANGE UP

## 2020-06-19 PROCEDURE — 99233 SBSQ HOSP IP/OBS HIGH 50: CPT

## 2020-06-19 RX ORDER — DILTIAZEM HCL 120 MG
30 CAPSULE, EXT RELEASE 24 HR ORAL EVERY 8 HOURS
Refills: 0 | Status: DISCONTINUED | OUTPATIENT
Start: 2020-06-19 | End: 2020-06-22

## 2020-06-19 RX ADMIN — ATORVASTATIN CALCIUM 10 MILLIGRAM(S): 80 TABLET, FILM COATED ORAL at 21:14

## 2020-06-19 RX ADMIN — Medication 30 MILLIGRAM(S): at 22:28

## 2020-06-19 RX ADMIN — Medication 30 MILLIGRAM(S): at 14:38

## 2020-06-19 RX ADMIN — Medication 50 MILLIGRAM(S): at 09:17

## 2020-06-19 RX ADMIN — Medication 40 MILLIGRAM(S): at 07:09

## 2020-06-19 RX ADMIN — Medication 30 MILLIGRAM(S): at 09:16

## 2020-06-19 RX ADMIN — CEFTRIAXONE 1000 MILLIGRAM(S): 500 INJECTION, POWDER, FOR SOLUTION INTRAMUSCULAR; INTRAVENOUS at 21:13

## 2020-06-19 RX ADMIN — Medication 40 MILLIGRAM(S): at 17:50

## 2020-06-19 RX ADMIN — APIXABAN 10 MILLIGRAM(S): 2.5 TABLET, FILM COATED ORAL at 21:14

## 2020-06-19 RX ADMIN — APIXABAN 10 MILLIGRAM(S): 2.5 TABLET, FILM COATED ORAL at 09:17

## 2020-06-19 RX ADMIN — AZITHROMYCIN 500 MILLIGRAM(S): 500 TABLET, FILM COATED ORAL at 09:17

## 2020-06-19 NOTE — PROGRESS NOTE ADULT - SUBJECTIVE AND OBJECTIVE BOX
CC/reason for follow up: afib, dvt    S: confused this morning but denied any dyspnea. limited history due to confusion. later on, pt became more alert and staff were able to get her out of bed, walked to the bathroom and sat in the chair to eat lunch. she's now feeding herself and watching TV    ROS: no chest pain, no dyspnea    T(C): 36.5 (06-19-20 @ 09:14), Max: 36.6 (06-18-20 @ 20:09)  HR: 82 (06-19-20 @ 11:02) (70 - 105)  BP: 106/68 (06-19-20 @ 11:02) (91/49 - 127/77)  RR: 20 (06-19-20 @ 09:14) (17 - 20)  SpO2: 94% (06-19-20 @ 09:14) (93% - 95%)    Gen - resting in bed, responds to questions, cooperative, in no acute distress but chronically ill appearing  HEENT- PERRL, moist mucus membranes, OP clear  CV - IRIR, +murmur, No r/g, +pulses, mild b/l LE edema  Lungs - Good effort, Clear to auscultation bilaterally  back- kyphotic  Abdomen - Soft, nontender, nondistended, +BS, No rebound/rigidity/guarding  Ext - No cyanosis/clubbing.   Skin - No rashes, No jaundice  Psych- Alert & oriented to person   Neuro- fluent speech, no facial droop, EOMI. moves all ext    MEDICATIONS  (STANDING):  apixaban 10 milliGRAM(s) Oral every 12 hours  atorvastatin 10 milliGRAM(s) Oral at bedtime  azithromycin   Tablet 500 milliGRAM(s) Oral daily  cefTRIAXone Injectable. 1000 milliGRAM(s) IV Push every 24 hours  diltiazem Infusion 5 mG/Hr (5 mL/Hr) IV Continuous <Continuous>  furosemide   Injectable 40 milliGRAM(s) IV Push <User Schedule>  metoprolol succinate ER 50 milliGRAM(s) Oral daily    MEDICATIONS  (PRN):  OLANZapine Injectable 2.5 milliGRAM(s) IntraMuscular once PRN agitation      Diagnostic studies: personally reviewed  LABS: All Labs Reviewed:                        14.2   5.17  )-----------( 162      ( 18 Jun 2020 07:32 )             45.8     06-19    136  |  104  |  31<H>  ----------------------------<  111<H>  3.8   |  23  |  1.18    Ca    9.3      19 Jun 2020 07:25  Phos  3.5     06-18  Mg     2.1     06-19    TPro  7.1  /  Alb  2.9<L>  /  TBili  1.0  /  DBili  x   /  AST  38<H>  /  ALT  29  /  AlkPhos  139<H>  06-17    PT/INR - ( 17 Jun 2020 18:20 )   PT: 12.4 sec;   INR: 1.11 ratio         PTT - ( 17 Jun 2020 18:20 )  PTT:29.9 sec  CARDIAC MARKERS ( 18 Jun 2020 02:54 )  0.071 ng/mL / x     / x     / x     / x      CARDIAC MARKERS ( 17 Jun 2020 22:56 )  0.053 ng/mL / x     / x     / x     / x      CARDIAC MARKERS ( 17 Jun 2020 18:20 )  0.058 ng/mL / x     / x     / x     / x            Blood Culture: 06-17 @ 23:05  Organism --  Gram Stain Blood -- Gram Stain --  Specimen Source .Urine Clean Catch (Midstream)  Culture-Blood --    06-17 @ 18:20  Organism --  Gram Stain Blood -- Gram Stain --  Specimen Source .Blood None  Culture-Blood --      RADIOLOGY/EKG:      < from: CT Head No Cont (12.16.19 @ 14:13) >  IMPRESSION:   Moderate periventricular white matter ischemia. Global   atrophy.    < end of copied text >  < from: CT Cervical Spine No Cont (01.10.19 @ 02:44) >  IMPRESSION:    No acute cervical spine fracture or evidence of traumatic malalignment.   Chronic compression fracture of T3.    Multilevel cervical spondylosis.    < end of copied text >    < from: TTE Echo Complete w/o Contrast w/ Doppler (06.18.20 @ 10:23) >   Summary     Fibrocalcific changes noted to the mitral valve leaflets with preserved   leaflet excursion.   Moderate to severe (3+) mitral regurgitation is present.   There are fibrocalcific changes noted to the aortic valve leaflets with   restriction in leaflet excursion. Transaortic gradients are underestimated   due to impaired left ventricle systolic function. At least mild aortic   stenosis is present. Mild aortic insufficiency noted.   The tricuspid valve leaflets are thin and pliable; valve motion is normal.   Moderate (2+) tricuspid valve regurgitation is present.   Severe pulmonary hypertension.   Normal appearing pulmonic valve structure.   Mild to moderate pulmonic valvular regurgitation is present.   The left atrium is severely dilated.   Mild concentric left ventricular hypertrophy is present.   Estimated left ventricular ejection fraction is 30-35 %.   The right atrium appears mildly dilated.   The right ventricle exhibits mild dilation, mild diffuse hypokinesis, and   mild depression of contractility.   IVC is at the upper limits of normal and not collapsing with inspiration.   Pleural effusion - is present..   A small to moderate pericardial effusion is present.    < end of copied text >    < from: US Duplex Venous Lower Ext Complete, Bilateral (06.18.20 @ 03:34) >  1. Acute deep venous thrombosis in the left posterior tibial vein. Chronic deep venous thrombosis in the distal left femoral vein.  2. No evidence of a deep venous thrombosis in the right lower extremity. Nonvisualization of the right popliteal vein.    < end of copied text >    < from: CT Angio Chest PE Protocol w/ IV Cont (06.18.20 @ 13:32) >  IMPRESSION:     Large right lower lobe pulmonary artery embolus. Asymmetric dilatation of the right-sided cardiac chambers with reflux of contrast into dilated IVC and hepatic veins suggesting elevated right heart pressures.    Pulmonary edema and moderate bilateral pleural effusions.     < end of copied text >

## 2020-06-19 NOTE — PROGRESS NOTE ADULT - SUBJECTIVE AND OBJECTIVE BOX
CHIEF COMPLAINT:    HPI:  89 y/o F PMHx significant for ASHD s/p PCI x1, b/l hearing loss, pancreatic mass, HLD, HTN, OA, and spinal stenosis brought in from [facility] [place] due to SOB, and new onset afib. Pt found to be hypoxic at 84% on RA and place on supp O2. Unable to obtain proper history from pt due to confusion. Pt opens eyes to commands and denies pain. As per pt's daughter, pt's mental status has declined since a TIA in 2017, especially since the COVID pandemic as she has been largely isolated in her room; used to ambulate with walker but not as of recently. Recently got a 24 hour aide this week. Saw pt 2 weeks ago and noticed the increased b/l LE swelling. There has been no official diagnosis of dementia made but has been discussed with pt's PCP. Daughter is not aware if pt had fever or cough prior to ED arrival.     In the ED, CXR showing cardiomegaly with b/l perihilar and basilar airspace consolidations. BNP 6133, D-Dimer 708, Lactate 2.1-->1.9 s/p IVF, Lasix 40 IV x1, CTX and Azt x1 dose, mildly elevated trops 0.058-->0.053  Pt in Afib w/ RVR 's, given Cardizem 10mg IV x1 dose, rate improved to 100's (17 Jun 2020 22:52)    I have not seen her in office since 2016. Venous Doppler showed L DVT. Pt started on Eliquis. Trop mildly elevated, due to demand ischemia. No EKG available. AF on monitor with -120.     6/19: AF with VR 70's on IV Cardizem 2.5 mg/hr. BP about 120/70. Echo showed LVEF 30-35%, with LVH, mild AS, 3+MR, 2+TR, dilated LA. I switched pt to oral Cardizem. Would continue BB. No ACE or ARB due to CKD. No further W/U at her age. 4 years ago pt refused BX of her pancreatic mass. Will not follow daily.      PAST MEDICAL & SURGICAL HISTORY:  Transient ischemic attack (TIA): 2017  Hypercholesterolemia  Hypertension, unspecified type  PVCs (premature ventricular contractions)  Deviated septum  Pancreatic mass  Arteriosclerotic heart disease (ASHD)  Spinal stenosis, unspecified spinal region  Osteoarthritis, unspecified osteoarthritis type, unspecified site  History of coronary artery stent placement: approx 2005      Allergies    codeine (Other)    Intolerances        Occupation:  Alochol: Denied  Smoking: Denied  Drug Use: Denied  Marital Status:         FAMILY HISTORY:  No pertinent family history in first degree relatives      REVIEW OF SYSTEMS:    CONSTITUTIONAL: No weakness, fevers or chills  EYES/ENT: No visual changes;  No vertigo or throat pain   NECK: No pain or stiffness  RESPIRATORY: No cough, wheezing, hemoptysis; No shortness of breath  CARDIOVASCULAR: No chest pain or palpitations  GASTROINTESTINAL: No abdominal or epigastric pain. No nausea, vomiting, or hematemesis; No diarrhea or constipation. No melena or hematochezia.  GENITOURINARY: No dysuria, frequency or hematuria  NEUROLOGICAL: No numbness or weakness  SKIN: No itching, burning, rashes, or lesions   All other review of systems is negative unless indicated above    Vital Signs Last 24 Hrs  T(C): 36.7 (18 Jun 2020 02:21), Max: 36.9 (17 Jun 2020 18:00)  T(F): 98.1 (18 Jun 2020 02:21), Max: 98.5 (17 Jun 2020 18:00)  HR: 113 (18 Jun 2020 02:21) (68 - 132)  BP: 124/100 (18 Jun 2020 02:21) (102/65 - 136/99)  BP(mean): --  RR: 18 (18 Jun 2020 02:21) (16 - 18)  SpO2: 100% (18 Jun 2020 02:21) (84% - 100%)    I&O's Summary      PHYSICAL EXAM:    Constitutional: NAD, awake and alert, well-developed  HEENT: PERR, EOMI,  No oral cyananosis.  Neck:  supple,  No JVD  Respiratory: Breath sounds are clear bilaterally, No wheezing, rales or rhonchi  Cardiovascular: S1 and S2, regular rate and rhythm, no Murmurs, gallops or rubs  Gastrointestinal: Bowel Sounds present, soft, nontender.   Extremities: No peripheral edema. No clubbing or cyanosis.  Vascular: 2+ peripheral pulses  Neurological: A/O x 3, no focal deficits  Musculoskeletal: no calf tenderness.  Skin: No rashes.    MEDICATIONS:  MEDICATIONS  (STANDING):  atorvastatin 10 milliGRAM(s) Oral at bedtime  azithromycin   Tablet 500 milliGRAM(s) Oral daily  cefTRIAXone Injectable. 1000 milliGRAM(s) IV Push every 24 hours  furosemide   Injectable 40 milliGRAM(s) IV Push <User Schedule>  metoprolol succinate ER 50 milliGRAM(s) Oral daily      LABS: All Labs Reviewed:                        14.2   5.17  )-----------( 162      ( 18 Jun 2020 07:32 )             45.8                         14.0   5.60  )-----------( 196      ( 17 Jun 2020 18:20 )             45.9     18 Jun 2020 07:32    140    |  105    |  32     ----------------------------<  110    4.3     |  28     |  1.31   17 Jun 2020 18:20    138    |  106    |  31     ----------------------------<  129    4.3     |  25     |  1.25     Ca    9.5        18 Jun 2020 07:32  Ca    9.4        17 Jun 2020 18:20  Phos  3.5       18 Jun 2020 07:32  Mg     2.2       18 Jun 2020 07:32    TPro  7.1    /  Alb  2.9    /  TBili  1.0    /  DBili  x      /  AST  38     /  ALT  29     /  AlkPhos  139    17 Jun 2020 18:20    PT/INR - ( 17 Jun 2020 18:20 )   PT: 12.4 sec;   INR: 1.11 ratio         PTT - ( 17 Jun 2020 18:20 )  PTT:29.9 sec  CARDIAC MARKERS ( 18 Jun 2020 02:54 )  0.071 ng/mL / x     / x     / x     / x      CARDIAC MARKERS ( 17 Jun 2020 22:56 )  0.053 ng/mL / x     / x     / x     / x      CARDIAC MARKERS ( 17 Jun 2020 18:20 )  0.058 ng/mL / x     / x     / x     / x          Blood Culture:   06-17 @ 18:20  Pro Bnp 6133    06-18 @ 07:32  TSH: 1.10      RADIOLOGY/EKG:

## 2020-06-19 NOTE — PROGRESS NOTE ADULT - ASSESSMENT
A/P: 89 y/o F PMHx significant for ASHD s/p PCI x1, b/l hearing loss, pancreatic mass, HLD, HTN, OA, and spinal stenosis [facility], and new onset afib.    1.	Acute respiratory failure w/ hypoxia, likely multifactorial due to acute PE, CHF exac and concern for GNR PNA  2.	Acute RLL PE w/ R heart strain  3.	acute LLE DVT/ chronic LLE DVT   4.	New Onset Afib  5.	Acute on chronic systolic CHF, EF 30-35%  6.	severe pulmonary HTN  7.	Elevated troponin  8.	elevated creat, doesn't meet criteria for UMBERTO at this time  9.	COVID19 IgG Antibody positive    -cardiomyopathy and acute thromboembolism likely due to COVID19 infection, currently PCR neg but antibodies are positive. per family pt's health started declining from January to May and they were suspecting covid infection during the pandemic.   -CHADSVASC: 5, started on Eliquis (getting loading dose for acute PE/DVT)  -s/p Cardizem drip, now changed to oral  -Cont w/ metoprolol succinate 50mg w/ holding parameters  -nml TSH  -echo findings as above  -IV lasix BID, Strict I&O's, daily Weights  -CXR notable for perihilar and basilar interstitial opacities R>L  -Lactate 2.1-->1.9  -wean off O2 as nelly  -cont empiric ceftriaxone and azithromycin  -Blood/Urine cx NGTD  -COVID neg, antibx testing positive  -procalcitonin slightly elevated at 0.11  -Monitor renal function closely while on lasix, avoid nephrotoxic agents  -elevated trop likely due to demand ischemia  -cardio consult appreciated    ADVANCE DIRECTIVES/ CODE STATUS: DNR/DNI - MOLST on chart    DISPO: inpatient. tele monitoring. guarded prognosis.  d/w pt's other daughter, Kalani [phone]. updates given. she was very appreciative of the information and the care provided by staff. A/P: 89 y/o F PMHx significant for ASHD s/p PCI x1, b/l hearing loss, pancreatic mass, HLD, HTN, OA, and spinal stenosis [facility], and new onset afib.    1.	Acute respiratory failure w/ hypoxia, likely multifactorial due to acute PE, CHF exac and concern for GNR PNA  2.	Acute RLL PE w/ R heart strain  3.	acute LLE DVT/ chronic LLE DVT   4.	New Onset Afib  5.	Acute on chronic systolic CHF, EF 30-35%  6.	severe pulmonary HTN  7.	Elevated troponin  8.	elevated creat, doesn't meet criteria for UMBERTO at this time  9.	COVID19 IgG Antibody positive    -cardiomyopathy and acute thromboembolism likely due to COVID19 infection, currently PCR neg but antibodies are positive. per family pt's health started declining from January to May and they were suspecting covid infection during the pandemic.   -CHADSVASC: 5, started on Eliquis (getting loading dose for acute PE/DVT)  -s/p Cardizem drip, now changed to oral  -Cont w/ metoprolol succinate 50mg w/ holding parameters  -nml TSH  -echo findings as above  -IV lasix BID, Strict I&O's, daily Weights  -CXR notable for perihilar and basilar interstitial opacities R>L  -Lactate 2.1-->1.9  -wean off O2 as nelly  -cont empiric ceftriaxone and azithromycin  -Blood/Urine cx NGTD  -COVID neg, antibx testing positive  -procalcitonin slightly elevated at 0.11  -Monitor renal function closely while on lasix, avoid nephrotoxic agents  -elevated trop likely due to demand ischemia  -cardio consult appreciated    ADVANCE DIRECTIVES/ CODE STATUS: DNR/DNI - MOLST on chart    DISPO: inpatient. tele monitoring. guarded prognosis.  per HCP ok to talk to either etelvina or kalani. d/w pt's daughter, Kalani [phone]. updates given. she was very appreciative of the information and the care provided by staff.

## 2020-06-20 LAB
ANION GAP SERPL CALC-SCNC: 7 MMOL/L — SIGNIFICANT CHANGE UP (ref 5–17)
BUN SERPL-MCNC: 33 MG/DL — HIGH (ref 7–23)
CALCIUM SERPL-MCNC: 9.3 MG/DL — SIGNIFICANT CHANGE UP (ref 8.5–10.1)
CHLORIDE SERPL-SCNC: 103 MMOL/L — SIGNIFICANT CHANGE UP (ref 96–108)
CO2 SERPL-SCNC: 26 MMOL/L — SIGNIFICANT CHANGE UP (ref 22–31)
CREAT SERPL-MCNC: 1.2 MG/DL — SIGNIFICANT CHANGE UP (ref 0.5–1.3)
GLUCOSE SERPL-MCNC: 116 MG/DL — HIGH (ref 70–99)
HCT VFR BLD CALC: 41 % — SIGNIFICANT CHANGE UP (ref 34.5–45)
HGB BLD-MCNC: 12.6 G/DL — SIGNIFICANT CHANGE UP (ref 11.5–15.5)
MAGNESIUM SERPL-MCNC: 2.1 MG/DL — SIGNIFICANT CHANGE UP (ref 1.6–2.6)
MCHC RBC-ENTMCNC: 28.4 PG — SIGNIFICANT CHANGE UP (ref 27–34)
MCHC RBC-ENTMCNC: 30.7 GM/DL — LOW (ref 32–36)
MCV RBC AUTO: 92.3 FL — SIGNIFICANT CHANGE UP (ref 80–100)
PLATELET # BLD AUTO: 173 K/UL — SIGNIFICANT CHANGE UP (ref 150–400)
POTASSIUM SERPL-MCNC: 3.9 MMOL/L — SIGNIFICANT CHANGE UP (ref 3.5–5.3)
POTASSIUM SERPL-SCNC: 3.9 MMOL/L — SIGNIFICANT CHANGE UP (ref 3.5–5.3)
RBC # BLD: 4.44 M/UL — SIGNIFICANT CHANGE UP (ref 3.8–5.2)
RBC # FLD: 16.1 % — HIGH (ref 10.3–14.5)
SODIUM SERPL-SCNC: 136 MMOL/L — SIGNIFICANT CHANGE UP (ref 135–145)
WBC # BLD: 6.27 K/UL — SIGNIFICANT CHANGE UP (ref 3.8–10.5)
WBC # FLD AUTO: 6.27 K/UL — SIGNIFICANT CHANGE UP (ref 3.8–10.5)

## 2020-06-20 PROCEDURE — 99232 SBSQ HOSP IP/OBS MODERATE 35: CPT

## 2020-06-20 RX ORDER — FUROSEMIDE 40 MG
40 TABLET ORAL DAILY
Refills: 0 | Status: DISCONTINUED | OUTPATIENT
Start: 2020-06-21 | End: 2020-06-21

## 2020-06-20 RX ADMIN — ATORVASTATIN CALCIUM 10 MILLIGRAM(S): 80 TABLET, FILM COATED ORAL at 21:19

## 2020-06-20 RX ADMIN — Medication 50 MILLIGRAM(S): at 11:35

## 2020-06-20 RX ADMIN — APIXABAN 10 MILLIGRAM(S): 2.5 TABLET, FILM COATED ORAL at 10:09

## 2020-06-20 RX ADMIN — AZITHROMYCIN 500 MILLIGRAM(S): 500 TABLET, FILM COATED ORAL at 10:09

## 2020-06-20 RX ADMIN — APIXABAN 10 MILLIGRAM(S): 2.5 TABLET, FILM COATED ORAL at 21:19

## 2020-06-20 RX ADMIN — CEFTRIAXONE 1000 MILLIGRAM(S): 500 INJECTION, POWDER, FOR SOLUTION INTRAMUSCULAR; INTRAVENOUS at 21:19

## 2020-06-20 RX ADMIN — Medication 30 MILLIGRAM(S): at 06:26

## 2020-06-20 RX ADMIN — Medication 30 MILLIGRAM(S): at 21:19

## 2020-06-20 RX ADMIN — Medication 30 MILLIGRAM(S): at 14:58

## 2020-06-20 RX ADMIN — Medication 40 MILLIGRAM(S): at 08:54

## 2020-06-20 NOTE — PROGRESS NOTE ADULT - ASSESSMENT
A/P: 89 y/o F PMHx significant for ASHD s/p PCI x1, b/l hearing loss, pancreatic mass, HLD, HTN, OA, and spinal stenosis brought in [facility] [place] due to SOB, and new onset afib.    1.	Acute respiratory failure w/ hypoxia, likely multifactorial due to acute PE, CHF exac and concern for GNR PNA  2.	Acute RLL PE w/ R heart strain  3.	acute LLE DVT/ chronic LLE DVT   4.	New Onset Afib  5.	Acute on chronic systolic CHF, EF 30-35%  6.	severe pulmonary HTN  7.	Elevated troponin  8.	elevated creat, doesn't meet criteria for UMBERTO at this time  9.	COVID19 IgG Antibody positive    -cardiomyopathy and acute thromboembolism likely due to COVID19 infection, currently PCR neg but antibodies are positive. per family pt's health started declining from January to May and they were suspecting covid infection during the pandemic.   -CHADSVASC: 5, started on Eliquis 10 mg bid (getting loading dose for acute PE/DVT)  -s/p Cardizem drip, then changed to oral cardizem  -Cont w/ metoprolol succinate 50mg w/ holding parameters  -nml TSH  -echo findings as above  -on IV lasix BID, Strict I&O's, daily Weights --- weight down to 62 kg. creatinine increasing, will change lasix from BID to daily  -CXR notable for perihilar and basilar interstitial opacities R>L  -Lactate 2.1-->1.9  -wean off O2 as nelly  -cont empiric ceftriaxone and azithromycin  -Blood/Urine cx NGTD  -COVID neg, antibx testing positive  -procalcitonin slightly elevated at 0.11  -Monitor renal function closely while on lasix, avoid nephrotoxic agents  -elevated trop likely due to demand ischemia  -cardio consult appreciated    ADVANCE DIRECTIVES/ CODE STATUS: DNR/DNI - MOLST on chart    DISPO: inpatient. tele monitoring. guarded prognosis.  per HCP ok to talk to either etelvina or aklani. d/w pt's daughter, Kalani [phone] on 6/19

## 2020-06-20 NOTE — PROGRESS NOTE ADULT - SUBJECTIVE AND OBJECTIVE BOX
CC/reason for follow up: afib, dvt    S: no new events, no complaints    ROS: no chest pain, no dyspnea    T(C): 36.5 (06-20-20 @ 08:33), Max: 37 (06-19-20 @ 20:45)  HR: 118 (06-20-20 @ 08:33) (82 - 118)  BP: 129/85 (06-20-20 @ 08:33) (90/49 - 129/85)  RR: 18 (06-20-20 @ 08:33) (18 - 18)  SpO2: 94% (06-20-20 @ 08:33) (94% - 99%)    Gen - resting in bed, responds to questions, cooperative, in no acute distress but chronically ill appearing  HEENT- PERRL, moist mucus membranes, OP clear  CV - IRIR, +murmur, No r/g, +pulses, mild b/l LE edema  Lungs - Good effort, Clear to auscultation bilaterally  back- kyphotic  Abdomen - Soft, nontender, nondistended, +BS, No rebound/rigidity/guarding  Ext - No cyanosis/clubbing.   Skin - No rashes, No jaundice  Psych- Alert & oriented to person   Neuro- fluent speech, no facial droop, EOMI. moves all ext    MEDICATIONS  (STANDING):  apixaban 10 milliGRAM(s) Oral every 12 hours  atorvastatin 10 milliGRAM(s) Oral at bedtime  azithromycin   Tablet 500 milliGRAM(s) Oral daily  cefTRIAXone Injectable. 1000 milliGRAM(s) IV Push every 24 hours  diltiazem    Tablet 30 milliGRAM(s) Oral every 8 hours  furosemide   Injectable 40 milliGRAM(s) IV Push <User Schedule>  metoprolol succinate ER 50 milliGRAM(s) Oral daily    Diagnostic studies: personally reviewed  LABS: All Labs Reviewed:                        12.6   6.27  )-----------( 173      ( 20 Jun 2020 08:03 )             41.0     06-20    136  |  103  |  33<H>  ----------------------------<  116<H>  3.9   |  26  |  1.20    Ca    9.3      20 Jun 2020 08:03  Mg     2.1     06-20              Blood Culture: 06-17 @ 23:05  Organism --  Gram Stain Blood -- Gram Stain --  Specimen Source .Urine Clean Catch (Midstream)  Culture-Blood --    06-17 @ 18:20  Organism --  Gram Stain Blood -- Gram Stain --  Specimen Source .Blood None  Culture-Blood --      RADIOLOGY/EKG:      < from: CT Head No Cont (12.16.19 @ 14:13) >  IMPRESSION:   Moderate periventricular white matter ischemia. Global   atrophy.    < end of copied text >  < from: CT Cervical Spine No Cont (01.10.19 @ 02:44) >  IMPRESSION:    No acute cervical spine fracture or evidence of traumatic malalignment.   Chronic compression fracture of T3.    Multilevel cervical spondylosis.    < end of copied text >    < from: TTE Echo Complete w/o Contrast w/ Doppler (06.18.20 @ 10:23) >   Summary     Fibrocalcific changes noted to the mitral valve leaflets with preserved   leaflet excursion.   Moderate to severe (3+) mitral regurgitation is present.   There are fibrocalcific changes noted to the aortic valve leaflets with   restriction in leaflet excursion. Transaortic gradients are underestimated   due to impaired left ventricle systolic function. At least mild aortic   stenosis is present. Mild aortic insufficiency noted.   The tricuspid valve leaflets are thin and pliable; valve motion is normal.   Moderate (2+) tricuspid valve regurgitation is present.   Severe pulmonary hypertension.   Normal appearing pulmonic valve structure.   Mild to moderate pulmonic valvular regurgitation is present.   The left atrium is severely dilated.   Mild concentric left ventricular hypertrophy is present.   Estimated left ventricular ejection fraction is 30-35 %.   The right atrium appears mildly dilated.   The right ventricle exhibits mild dilation, mild diffuse hypokinesis, and   mild depression of contractility.   IVC is at the upper limits of normal and not collapsing with inspiration.   Pleural effusion - is present..   A small to moderate pericardial effusion is present.    < end of copied text >    < from: US Duplex Venous Lower Ext Complete, Bilateral (06.18.20 @ 03:34) >  1. Acute deep venous thrombosis in the left posterior tibial vein. Chronic deep venous thrombosis in the distal left femoral vein.  2. No evidence of a deep venous thrombosis in the right lower extremity. Nonvisualization of the right popliteal vein.    < end of copied text >    < from: CT Angio Chest PE Protocol w/ IV Cont (06.18.20 @ 13:32) >  IMPRESSION:     Large right lower lobe pulmonary artery embolus. Asymmetric dilatation of the right-sided cardiac chambers with reflux of contrast into dilated IVC and hepatic veins suggesting elevated right heart pressures.    Pulmonary edema and moderate bilateral pleural effusions.     < end of copied text >

## 2020-06-21 LAB
ANION GAP SERPL CALC-SCNC: 6 MMOL/L — SIGNIFICANT CHANGE UP (ref 5–17)
BUN SERPL-MCNC: 39 MG/DL — HIGH (ref 7–23)
CALCIUM SERPL-MCNC: 9.1 MG/DL — SIGNIFICANT CHANGE UP (ref 8.5–10.1)
CHLORIDE SERPL-SCNC: 102 MMOL/L — SIGNIFICANT CHANGE UP (ref 96–108)
CO2 SERPL-SCNC: 29 MMOL/L — SIGNIFICANT CHANGE UP (ref 22–31)
CREAT SERPL-MCNC: 1.13 MG/DL — SIGNIFICANT CHANGE UP (ref 0.5–1.3)
GLUCOSE SERPL-MCNC: 101 MG/DL — HIGH (ref 70–99)
POTASSIUM SERPL-MCNC: 3.3 MMOL/L — LOW (ref 3.5–5.3)
POTASSIUM SERPL-SCNC: 3.3 MMOL/L — LOW (ref 3.5–5.3)
SODIUM SERPL-SCNC: 137 MMOL/L — SIGNIFICANT CHANGE UP (ref 135–145)

## 2020-06-21 PROCEDURE — 99232 SBSQ HOSP IP/OBS MODERATE 35: CPT

## 2020-06-21 RX ORDER — POTASSIUM CHLORIDE 20 MEQ
40 PACKET (EA) ORAL EVERY 4 HOURS
Refills: 0 | Status: COMPLETED | OUTPATIENT
Start: 2020-06-21 | End: 2020-06-21

## 2020-06-21 RX ORDER — FUROSEMIDE 40 MG
40 TABLET ORAL
Refills: 0 | Status: DISCONTINUED | OUTPATIENT
Start: 2020-06-22 | End: 2020-06-23

## 2020-06-21 RX ORDER — FUROSEMIDE 40 MG
20 TABLET ORAL ONCE
Refills: 0 | Status: COMPLETED | OUTPATIENT
Start: 2020-06-21 | End: 2020-06-21

## 2020-06-21 RX ADMIN — Medication 20 MILLIGRAM(S): at 17:21

## 2020-06-21 RX ADMIN — Medication 40 MILLIEQUIVALENT(S): at 17:32

## 2020-06-21 RX ADMIN — APIXABAN 10 MILLIGRAM(S): 2.5 TABLET, FILM COATED ORAL at 21:08

## 2020-06-21 RX ADMIN — APIXABAN 10 MILLIGRAM(S): 2.5 TABLET, FILM COATED ORAL at 09:17

## 2020-06-21 RX ADMIN — Medication 30 MILLIGRAM(S): at 21:08

## 2020-06-21 RX ADMIN — Medication 40 MILLIEQUIVALENT(S): at 14:26

## 2020-06-21 RX ADMIN — Medication 30 MILLIGRAM(S): at 05:41

## 2020-06-21 RX ADMIN — CEFTRIAXONE 1000 MILLIGRAM(S): 500 INJECTION, POWDER, FOR SOLUTION INTRAMUSCULAR; INTRAVENOUS at 21:08

## 2020-06-21 RX ADMIN — Medication 50 MILLIGRAM(S): at 09:17

## 2020-06-21 RX ADMIN — AZITHROMYCIN 500 MILLIGRAM(S): 500 TABLET, FILM COATED ORAL at 09:17

## 2020-06-21 RX ADMIN — Medication 20 MILLIGRAM(S): at 03:06

## 2020-06-21 RX ADMIN — Medication 30 MILLIGRAM(S): at 14:27

## 2020-06-21 RX ADMIN — ATORVASTATIN CALCIUM 10 MILLIGRAM(S): 80 TABLET, FILM COATED ORAL at 21:08

## 2020-06-21 RX ADMIN — Medication 40 MILLIGRAM(S): at 10:09

## 2020-06-21 NOTE — PROGRESS NOTE ADULT - ASSESSMENT
A/P: 89 y/o F PMHx significant for ASHD s/p PCI x1, b/l hearing loss, pancreatic mass, HLD, HTN, OA, and spinal stenosis brought in from [facility] [place] due to SOB, and new onset afib.    1.	Acute respiratory failure w/ hypoxia, likely multifactorial due to acute PE, CHF exac and concern for GNR PNA  2.	Acute RLL PE w/ R heart strain  3.	acute LLE DVT/ chronic LLE DVT   4.	New Onset Afib  5.	Acute on chronic systolic CHF, EF 30-35%  6.	severe pulmonary HTN  7.	Elevated troponin  8.	elevated creat, doesn't meet criteria for UMBERTO at this time  9.	COVID19 IgG Antibody positive    -cardiomyopathy and acute thromboembolism likely due to COVID19 infection, currently PCR neg but antibodies are positive. per family pt's health started declining from January to May and they were suspecting covid infection during the pandemic.   -CHADSVASC: 5, started on Eliquis 10 mg bid (getting loading dose for acute PE/DVT)  -s/p Cardizem drip, then changed to oral cardizem  -Cont w/ metoprolol succinate 50mg w/ holding parameters  -nml TSH  -echo findings as above  -on IV lasix BID, Strict I&O's, daily Weights (decreased lasix to IV daily but then pt had increased work of breathing and lung crackles overnight) -- will increase lasix back to 40 mg IV BID  -CXR notable for perihilar and basilar interstitial opacities R>L  -Lactate 2.1-->1.9  -wean off O2 as nelly  -cont empiric ceftriaxone and azithromycin  -Blood/Urine cx NGTD  -COVID neg, antibx testing positive  -procalcitonin slightly elevated at 0.11  -Monitor renal function closely while on lasix, avoid nephrotoxic agents  -elevated trop likely due to demand ischemia  -cardio consult appreciated  -d/w pt's PCP, Dr. Mclean 6/20    ADVANCE DIRECTIVES/ CODE STATUS: DNR/DNI - MOLST on chart    DISPO: inpatient. tele monitoring. COVID 19 antibodies positive. now w/ cardiomyopathy (EF 30-35%), CHF exac, new A-fib, acute PE/DVT. on eliquis and IV lasix. pt will be 89 years old in a few weeks and has poor performance status, will likely need placement at NH or [facility]. don't think she could return to [place]. PT eval pending. family aware that pt's prognosis is guarded. cont medical mgmt, nothing aggressive, no procedures. Consider hospice? per HCP ok to talk to either etelvina or kalani. d/w pt's daughter, Kalani [phone] on 6/21. family would like to get an update again on Monday 6/22

## 2020-06-21 NOTE — PROGRESS NOTE ADULT - SUBJECTIVE AND OBJECTIVE BOX
CC/reason for follow up: afib, dvt    S: overnight pt was SOB, required extra 20 mg of IV lasix. now feels better but tired.    ROS: no chest pain, no dyspnea. limited ROS due to pt factors    T(C): 36.7 (06-21-20 @ 08:50), Max: 36.7 (06-21-20 @ 08:50)  HR: 101 (06-21-20 @ 08:50) (78 - 105)  BP: 142/75 (06-21-20 @ 08:50) (107/50 - 142/75)  RR: 18 (06-21-20 @ 08:50) (14 - 19)  SpO2: 96% (06-21-20 @ 08:50) (91% - 99%)    Gen - resting in bed, responds to questions, cooperative, in no acute distress but chronically ill appearing  HEENT- PERRL, moist mucus membranes, OP clear  CV - IRIR, +murmur, No r/g, +pulses, mild b/l LE edema  Lungs - Good effort, Clear to auscultation bilaterally  back- kyphotic  Abdomen - Soft, nontender, nondistended, +BS, No rebound/rigidity/guarding  Ext - No cyanosis/clubbing.   Skin - No rashes, No jaundice  Psych- Alert & oriented to person   Neuro- fluent speech, no facial droop, EOMI. moves all ext    MEDICATIONS  (STANDING):  apixaban 10 milliGRAM(s) Oral every 12 hours  atorvastatin 10 milliGRAM(s) Oral at bedtime  azithromycin   Tablet 500 milliGRAM(s) Oral daily  cefTRIAXone Injectable. 1000 milliGRAM(s) IV Push every 24 hours  diltiazem    Tablet 30 milliGRAM(s) Oral every 8 hours  furosemide   Injectable 20 milliGRAM(s) IV Push once  metoprolol succinate ER 50 milliGRAM(s) Oral daily  potassium chloride    Tablet ER 40 milliEquivalent(s) Oral every 4 hours      Diagnostic studies: personally reviewed  LABS: All Labs Reviewed:                        12.6   6.27  )-----------( 173      ( 20 Jun 2020 08:03 )             41.0     06-21    137  |  102  |  39<H>  ----------------------------<  101<H>  3.3<L>   |  29  |  1.13    Ca    9.1      21 Jun 2020 07:09  Mg     2.1     06-20    Blood Culture: 06-17 @ 23:05  Organism --  Gram Stain Blood -- Gram Stain --  Specimen Source .Urine Clean Catch (Midstream)  Culture-Blood --    06-17 @ 18:20  Organism --  Gram Stain Blood -- Gram Stain --  Specimen Source .Blood None  Culture-Blood --      RADIOLOGY/EKG:      < from: CT Head No Cont (12.16.19 @ 14:13) >  IMPRESSION:   Moderate periventricular white matter ischemia. Global   atrophy.    < end of copied text >  < from: CT Cervical Spine No Cont (01.10.19 @ 02:44) >  IMPRESSION:    No acute cervical spine fracture or evidence of traumatic malalignment.   Chronic compression fracture of T3.    Multilevel cervical spondylosis.    < end of copied text >    < from: TTE Echo Complete w/o Contrast w/ Doppler (06.18.20 @ 10:23) >   Summary     Fibrocalcific changes noted to the mitral valve leaflets with preserved   leaflet excursion.   Moderate to severe (3+) mitral regurgitation is present.   There are fibrocalcific changes noted to the aortic valve leaflets with   restriction in leaflet excursion. Transaortic gradients are underestimated   due to impaired left ventricle systolic function. At least mild aortic   stenosis is present. Mild aortic insufficiency noted.   The tricuspid valve leaflets are thin and pliable; valve motion is normal.   Moderate (2+) tricuspid valve regurgitation is present.   Severe pulmonary hypertension.   Normal appearing pulmonic valve structure.   Mild to moderate pulmonic valvular regurgitation is present.   The left atrium is severely dilated.   Mild concentric left ventricular hypertrophy is present.   Estimated left ventricular ejection fraction is 30-35 %.   The right atrium appears mildly dilated.   The right ventricle exhibits mild dilation, mild diffuse hypokinesis, and   mild depression of contractility.   IVC is at the upper limits of normal and not collapsing with inspiration.   Pleural effusion - is present..   A small to moderate pericardial effusion is present.    < end of copied text >    < from: US Duplex Venous Lower Ext Complete, Bilateral (06.18.20 @ 03:34) >  1. Acute deep venous thrombosis in the left posterior tibial vein. Chronic deep venous thrombosis in the distal left femoral vein.  2. No evidence of a deep venous thrombosis in the right lower extremity. Nonvisualization of the right popliteal vein.    < end of copied text >    < from: CT Angio Chest PE Protocol w/ IV Cont (06.18.20 @ 13:32) >  IMPRESSION:     Large right lower lobe pulmonary artery embolus. Asymmetric dilatation of the right-sided cardiac chambers with reflux of contrast into dilated IVC and hepatic veins suggesting elevated right heart pressures.    Pulmonary edema and moderate bilateral pleural effusions.     < end of copied text >

## 2020-06-22 LAB
ANION GAP SERPL CALC-SCNC: 4 MMOL/L — LOW (ref 5–17)
BUN SERPL-MCNC: 34 MG/DL — HIGH (ref 7–23)
CALCIUM SERPL-MCNC: 9.3 MG/DL — SIGNIFICANT CHANGE UP (ref 8.5–10.1)
CHLORIDE SERPL-SCNC: 103 MMOL/L — SIGNIFICANT CHANGE UP (ref 96–108)
CO2 SERPL-SCNC: 30 MMOL/L — SIGNIFICANT CHANGE UP (ref 22–31)
CREAT SERPL-MCNC: 1.14 MG/DL — SIGNIFICANT CHANGE UP (ref 0.5–1.3)
GLUCOSE SERPL-MCNC: 102 MG/DL — HIGH (ref 70–99)
HCT VFR BLD CALC: 41.1 % — SIGNIFICANT CHANGE UP (ref 34.5–45)
HGB BLD-MCNC: 12.4 G/DL — SIGNIFICANT CHANGE UP (ref 11.5–15.5)
MAGNESIUM SERPL-MCNC: 2 MG/DL — SIGNIFICANT CHANGE UP (ref 1.6–2.6)
MCHC RBC-ENTMCNC: 27.9 PG — SIGNIFICANT CHANGE UP (ref 27–34)
MCHC RBC-ENTMCNC: 30.2 GM/DL — LOW (ref 32–36)
MCV RBC AUTO: 92.6 FL — SIGNIFICANT CHANGE UP (ref 80–100)
PLATELET # BLD AUTO: 167 K/UL — SIGNIFICANT CHANGE UP (ref 150–400)
POTASSIUM SERPL-MCNC: 4.1 MMOL/L — SIGNIFICANT CHANGE UP (ref 3.5–5.3)
POTASSIUM SERPL-SCNC: 4.1 MMOL/L — SIGNIFICANT CHANGE UP (ref 3.5–5.3)
RBC # BLD: 4.44 M/UL — SIGNIFICANT CHANGE UP (ref 3.8–5.2)
RBC # FLD: 15.9 % — HIGH (ref 10.3–14.5)
SODIUM SERPL-SCNC: 137 MMOL/L — SIGNIFICANT CHANGE UP (ref 135–145)
WBC # BLD: 5.29 K/UL — SIGNIFICANT CHANGE UP (ref 3.8–10.5)
WBC # FLD AUTO: 5.29 K/UL — SIGNIFICANT CHANGE UP (ref 3.8–10.5)

## 2020-06-22 PROCEDURE — 99233 SBSQ HOSP IP/OBS HIGH 50: CPT

## 2020-06-22 RX ORDER — MORPHINE SULFATE 50 MG/1
0.2 CAPSULE, EXTENDED RELEASE ORAL
Refills: 0 | Status: DISCONTINUED | OUTPATIENT
Start: 2020-06-22 | End: 2020-06-22

## 2020-06-22 RX ORDER — SENNA PLUS 8.6 MG/1
2 TABLET ORAL AT BEDTIME
Refills: 0 | Status: DISCONTINUED | OUTPATIENT
Start: 2020-06-22 | End: 2020-06-26

## 2020-06-22 RX ORDER — MORPHINE SULFATE 50 MG/1
1 CAPSULE, EXTENDED RELEASE ORAL
Refills: 0 | Status: DISCONTINUED | OUTPATIENT
Start: 2020-06-22 | End: 2020-06-26

## 2020-06-22 RX ORDER — METOPROLOL TARTRATE 50 MG
50 TABLET ORAL
Refills: 0 | Status: DISCONTINUED | OUTPATIENT
Start: 2020-06-22 | End: 2020-06-25

## 2020-06-22 RX ORDER — METOPROLOL TARTRATE 50 MG
25 TABLET ORAL DAILY
Refills: 0 | Status: DISCONTINUED | OUTPATIENT
Start: 2020-06-22 | End: 2020-06-22

## 2020-06-22 RX ORDER — ACETAMINOPHEN 500 MG
650 TABLET ORAL EVERY 6 HOURS
Refills: 0 | Status: DISCONTINUED | OUTPATIENT
Start: 2020-06-22 | End: 2020-06-26

## 2020-06-22 RX ADMIN — Medication 40 MILLIGRAM(S): at 17:26

## 2020-06-22 RX ADMIN — APIXABAN 10 MILLIGRAM(S): 2.5 TABLET, FILM COATED ORAL at 09:53

## 2020-06-22 RX ADMIN — MORPHINE SULFATE 1 MILLIGRAM(S): 50 CAPSULE, EXTENDED RELEASE ORAL at 12:57

## 2020-06-22 RX ADMIN — ATORVASTATIN CALCIUM 10 MILLIGRAM(S): 80 TABLET, FILM COATED ORAL at 21:24

## 2020-06-22 RX ADMIN — Medication 50 MILLIGRAM(S): at 21:24

## 2020-06-22 RX ADMIN — SENNA PLUS 2 TABLET(S): 8.6 TABLET ORAL at 21:24

## 2020-06-22 RX ADMIN — AZITHROMYCIN 500 MILLIGRAM(S): 500 TABLET, FILM COATED ORAL at 10:09

## 2020-06-22 RX ADMIN — APIXABAN 10 MILLIGRAM(S): 2.5 TABLET, FILM COATED ORAL at 21:24

## 2020-06-22 RX ADMIN — Medication 40 MILLIGRAM(S): at 09:00

## 2020-06-22 RX ADMIN — Medication 30 MILLIGRAM(S): at 06:48

## 2020-06-22 RX ADMIN — CEFTRIAXONE 1000 MILLIGRAM(S): 500 INJECTION, POWDER, FOR SOLUTION INTRAMUSCULAR; INTRAVENOUS at 21:24

## 2020-06-22 NOTE — PHYSICAL THERAPY INITIAL EVALUATION ADULT - GENERAL OBSERVATIONS, REHAB EVAL
Patient received in bed on 3E, +HM, +O2@4L via nc, +PrimaFit.  Patient denied pain, no SOB observed throughout session.

## 2020-06-22 NOTE — PHARMACOTHERAPY INTERVENTION NOTE - COMMENTS
med history complete, reviewed medications with patients med list from [facility] and confirmed with doctor first med profile
recommended Metoprolol over cardizem secondary to patients reduced EF
Patient received 5 days worth of Azithromycin - ok to discontinue

## 2020-06-22 NOTE — PHYSICAL THERAPY INITIAL EVALUATION ADULT - MODALITIES TREATMENT COMMENTS
Patient  left in chair with CBIR and chair alarm active. All lines intact. CM, RN informed of session/status.

## 2020-06-22 NOTE — PROGRESS NOTE ADULT - SUBJECTIVE AND OBJECTIVE BOX
CHIEF COMPLAINT/DIAGNOSIS:    HPI: 89 y/o F PMHx significant for ASHD s/p PCI x1, b/l hearing loss, pancreatic mass, HLD, HTN, OA, and spinal stenosis brought in from [facility] due to SOB, and new onset afib. Pt found to be hypoxic at 84% on RA and place on supp O2. As per pt's daughter, pt's mental status has declined since a TIA in 2017, especially since the COVID pandemic as she has been largely isolated in her room; used to ambulate with walker but not as of recently. Found to have Acute respiratory failure w/ hypoxia due to acute PE, CHF exacerbation, PNA, new onset afib and severe pulmonary htn.    SUBJECTIVE:  6/22 -     REVIEW OF SYSTEMS:  All other review of systems is negative unless indicated above    PHYSICAL EXAM:  Constitutional: NAD, awake and alert, well-developed  HEENT: PERR, EOMI, Normal Hearing, MMM  Neck: Soft and supple, No LAD, No JVD  Respiratory: Breath sounds are clear bilaterally, No wheezing, rales or rhonchi  Cardiovascular: S1 and S2, regular rate and rhythm, no Murmurs, gallops or rubs  Gastrointestinal: Bowel Sounds present, soft, nontender, nondistended, no guarding, no rebound  Extremities: No peripheral edema  Vascular: 2+ peripheral pulses  Neurological: A/O x 3, no focal deficits  Musculoskeletal: 5/5 strength b/l upper and lower extremities  Skin: No rashes      Vital Signs Last 24 Hrs  T(C): 36.6 (22 Jun 2020 08:55), Max: 36.7 (21 Jun 2020 17:32)  T(F): 97.8 (22 Jun 2020 08:55), Max: 98 (21 Jun 2020 17:32)  HR: 73 (22 Jun 2020 10:10) (73 - 96)  BP: 93/63 (22 Jun 2020 10:10) (93/63 - 124/74)  BP(mean): --  RR: 18 (22 Jun 2020 08:55) (18 - 18)  SpO2: 96% (22 Jun 2020 08:55) (95% - 97%)              LABS: All Labs Reviewed:                        12.4   5.29  )-----------( 167      ( 22 Jun 2020 06:41 )             41.1     06-22    137  |  103  |  34<H>  ----------------------------<  102<H>  4.1   |  30  |  1.14    Ca    9.3      22 Jun 2020 06:41  Mg     2.0     06-22            Blood Culture: 06-17 @ 23:05  Organism --  Gram Stain Blood -- Gram Stain --  Specimen Source .Urine Clean Catch (Midstream)  Culture-Blood --    06-17 @ 18:20  Organism --  Gram Stain Blood -- Gram Stain --  Specimen Source .Blood None  Culture-Blood --      06-17 @ 23:05  Organism --  Gram Stain Blood -- Gram Stain --  Specimen Source .Urine Clean Catch (Midstream)  Culture-Blood --    06-17 @ 18:20  Organism --  Gram Stain Blood -- Gram Stain --  Specimen Source .Blood None  Culture-Blood --          RADIOLOGY:        ECHOCARDIOGRAM:          MEDICATIONS:  MEDICATIONS  (STANDING):  apixaban 10 milliGRAM(s) Oral every 12 hours  atorvastatin 10 milliGRAM(s) Oral at bedtime  cefTRIAXone Injectable. 1000 milliGRAM(s) IV Push every 24 hours  diltiazem    Tablet 30 milliGRAM(s) Oral every 8 hours  furosemide   Injectable 40 milliGRAM(s) IV Push <User Schedule>  metoprolol succinate ER 50 milliGRAM(s) Oral daily          TELEMETRY REVIEW:          ASSESSMENT AND PLAN: CHIEF COMPLAINT/DIAGNOSIS:    HPI: 89 y/o F PMHx significant for ASHD s/p PCI x1, b/l hearing loss, pancreatic mass, HLD, HTN, OA, and spinal stenosis brought in from [facility] due to SOB, and new onset afib. Pt found to be hypoxic at 84% on RA and place on supp O2. As per pt's daughter, pt's mental status has declined since a TIA in 2017, especially since the COVID pandemic as she has been largely isolated in her room; used to ambulate with walker but not as of recently. patient admitted to  and is being treated for Acute respiratory failure w/ hypoxia due to acute PE, CHF exacerbation, PNA, new onset afib and severe pulmonary htn.    SUBJECTIVE:  6/22 - c/o full body aches, shallow breathing.    REVIEW OF SYSTEMS:  All other review of systems is negative unless indicated above    PHYSICAL EXAM:  Gen - resting in bed, responds to questions, cooperative, in no acute distress but chronically ill appearing  HEENT- PERRL, moist mucus membranes, OP clear  CV - IRIR, +murmur, No r/g, +pulses, mild b/l LE edema  Lungs - Good effort, Clear to auscultation bilaterally  back- kyphotic  Abdomen - Soft, nontender, nondistended, +BS, No rebound/rigidity/guarding  Ext - No cyanosis/clubbing.   Skin - No rashes, No jaundice  Psych- Alert & oriented to person   Neuro- fluent speech, no facial droop, EOMI. moves all ext    Vital Signs Last 24 Hrs  T(C): 36.6 (22 Jun 2020 08:55), Max: 36.7 (21 Jun 2020 17:32)  T(F): 97.8 (22 Jun 2020 08:55), Max: 98 (21 Jun 2020 17:32)  HR: 73 (22 Jun 2020 10:10) (73 - 96)  BP: 93/63 (22 Jun 2020 10:10) (93/63 - 124/74)  BP(mean): --  RR: 18 (22 Jun 2020 08:55) (18 - 18)  SpO2: 96% (22 Jun 2020 08:55) (95% - 97%) -- 4/5L    LABS: All Labs Reviewed:                        12.4   5.29  )-----------( 167      ( 22 Jun 2020 06:41 )             41.1     06-22    137  |  103  |  34<H>  ----------------------------<  102<H>  4.1   |  30  |  1.14    Ca    9.3      22 Jun 2020 06:41  Mg     2.0     06-22    RADIOLOGY:  < from: CT Angio Chest PE Protocol w/ IV Cont (06.18.20 @ 13:32) >  IMPRESSION:   Large right lower lobe pulmonary artery embolus. Asymmetric dilatation of the right-sided cardiac chambers with reflux of contrast into dilated IVC and hepatic veins suggesting elevated right heart pressures.  Pulmonary edema and moderate bilateral pleural effusions.   < end of copied text >    ECHOCARDIOGRAM:  < from: TTE Echo Complete w/o Contrast w/ Doppler (06.18.20 @ 10:23) >     Fibrocalcific changes noted to the mitral valve leaflets with preserved   leaflet excursion.   Moderate to severe (3+) mitral regurgitation is present.   There are fibrocalcific changes noted to the aortic valve leaflets with   restriction in leaflet excursion. Transaortic gradients are underestimated   due to impaired left ventricle systolic function. At least mild aortic   stenosis is present. Mild aortic insufficiency noted.   The tricuspid valve leaflets are thin and pliable; valve motion is normal.   Moderate (2+) tricuspid valve regurgitation is present.   Severe pulmonary hypertension.   Normal appearing pulmonic valve structure.   Mild to moderate pulmonic valvular regurgitation is present.   The left atrium is severely dilated.   Mild concentric left ventricular hypertrophy is present.   Estimated left ventricular ejection fraction is 30-35 %.   The right atrium appears mildly dilated.   The right ventricle exhibits mild dilation, mild diffuse hypokinesis, and   mild depression of contractility.   IVC is at the upper limits of normal and not collapsing with inspiration.   Pleural effusion - is present..   A small to moderate pericardial effusion is present.  < end of copied text >      MEDICATIONS  (STANDING):  apixaban 10 milliGRAM(s) Oral every 12 hours  atorvastatin 10 milliGRAM(s) Oral at bedtime  cefTRIAXone Injectable. 1000 milliGRAM(s) IV Push every 24 hours  diltiazem    Tablet 30 milliGRAM(s) Oral every 8 hours  furosemide   Injectable 40 milliGRAM(s) IV Push <User Schedule>  metoprolol succinate ER 50 milliGRAM(s) Oral daily    MEDICATIONS  (PRN):  acetaminophen   Tablet .. 650 milliGRAM(s) Oral every 6 hours PRN Temp greater or equal to 38C (100.4F), Mild Pain (1 - 3)  morphine  - Injectable 1 milliGRAM(s) IV Push every 3 hours PRN dyspnea    TELEMETRY REVIEW:  6/22: afib 90s    ASSESSMENT AND PLAN:    1. Acute respiratory failure w/ hypoxia multifactorial due to acute PE, CHF exacerbation, GNR PNA, Severe Pulmonary HTN  - tele monitoring. tele review as above. afib 90s  - cont. supplemental 02 -- currently using 4/5L  - COVID PCR negative, COVID19 IgG Antibody positive  - cont. Ceftriaxone / Azithromycin completed  - Blood/Urine cx NGTD | Lactate 2.1-->1.9  - Morphine PRN for dyspnea    2. Acute RLL PE with RT heart strain | Acute LLE DVT/ chronic LLE DVT   - Cont. Eliquis    3. New Onset Afib  - Afib rate controlled  - CHADSVASC: 5, started on Eliquis 10 mg bid (getting loading dose for acute PE/DVT)  - Decrease BB to allow for diuresis (w/ parameters)  - TSH wnl  - s/p cardizem gtt. -- Cont. Cardizem PO 30 Q8    4. Acute on chronic systolic CHF, EF 30-35%  - Echo as above | CXR notable for perihilar and basilar interstitial opacities R>L  - Cont. IV Lasix BID  - Daily weight / low sodium diet / FR 1500ml  - Add ACEi when BP can tolerate   - cardio f/u appreciated     5. Elevated troponin  - Likely due to demand ischemia  - Cont. statin    6. Elevated creatinine - resolved  - Did not meet criteria for UMBERTO  - Monitor renal function closely while on Lasix, avoid nephrotoxic agents    7. OA, chronic pain  - Cont. Tylenol    8. DVT PPX  - PO Eliquis     ADVANCE DIRECTIVES/ CODE STATUS: DNR/DNI - MOLST on chart. See [facility] note.    6/22 - Updates given to daughter Kalani [phone] CHIEF COMPLAINT/DIAGNOSIS: SOB    HPI: 89 y/o F PMHx significant for ASHD s/p PCI x1, b/l hearing loss, pancreatic mass, HLD, HTN, OA, and spinal stenosis brought in from [facility] due to SOB, and new onset afib. Pt found to be hypoxic at 84% on RA and place on supp O2. As per pt's daughter, pt's mental status has declined since a TIA in 2017, especially since the COVID pandemic as she has been largely isolated in her room; used to ambulate with walker but not as of recently. patient admitted to  and is being treated for Acute respiratory failure w/ hypoxia due to acute PE, CHF exacerbation, PNA, new onset afib and severe pulmonary htn.    SUBJECTIVE:  6/22 - c/o full body aches, shallow breathing.    REVIEW OF SYSTEMS:  All other review of systems is negative unless indicated above    PHYSICAL EXAM:  Gen - resting in bed, responds to questions, cooperative, in no acute distress but chronically ill appearing  HEENT- PERRL, moist mucus membranes, OP clear  CV - IRIR, +murmur, No r/g, +pulses, mild b/l LE edema  Lungs - Good effort, Clear to auscultation bilaterally  back- kyphotic  Abdomen - Soft, nontender, nondistended, +BS, No rebound/rigidity/guarding  Ext - No cyanosis/clubbing.   Skin - No rashes, No jaundice  Psych- Alert & oriented to person   Neuro- fluent speech, no facial droop, EOMI. moves all ext    Vital Signs Last 24 Hrs  T(C): 36.6 (22 Jun 2020 08:55), Max: 36.7 (21 Jun 2020 17:32)  T(F): 97.8 (22 Jun 2020 08:55), Max: 98 (21 Jun 2020 17:32)  HR: 73 (22 Jun 2020 10:10) (73 - 96)  BP: 93/63 (22 Jun 2020 10:10) (93/63 - 124/74)  BP(mean): --  RR: 18 (22 Jun 2020 08:55) (18 - 18)  SpO2: 96% (22 Jun 2020 08:55) (95% - 97%) -- 4/5L    LABS: All Labs Reviewed:                        12.4   5.29  )-----------( 167      ( 22 Jun 2020 06:41 )             41.1     06-22    137  |  103  |  34<H>  ----------------------------<  102<H>  4.1   |  30  |  1.14    Ca    9.3      22 Jun 2020 06:41  Mg     2.0     06-22    RADIOLOGY:  < from: CT Angio Chest PE Protocol w/ IV Cont (06.18.20 @ 13:32) >  IMPRESSION:   Large right lower lobe pulmonary artery embolus. Asymmetric dilatation of the right-sided cardiac chambers with reflux of contrast into dilated IVC and hepatic veins suggesting elevated right heart pressures.  Pulmonary edema and moderate bilateral pleural effusions.   < end of copied text >    ECHOCARDIOGRAM:  < from: TTE Echo Complete w/o Contrast w/ Doppler (06.18.20 @ 10:23) >     Fibrocalcific changes noted to the mitral valve leaflets with preserved   leaflet excursion.   Moderate to severe (3+) mitral regurgitation is present.   There are fibrocalcific changes noted to the aortic valve leaflets with   restriction in leaflet excursion. Transaortic gradients are underestimated   due to impaired left ventricle systolic function. At least mild aortic   stenosis is present. Mild aortic insufficiency noted.   The tricuspid valve leaflets are thin and pliable; valve motion is normal.   Moderate (2+) tricuspid valve regurgitation is present.   Severe pulmonary hypertension.   Normal appearing pulmonic valve structure.   Mild to moderate pulmonic valvular regurgitation is present.   The left atrium is severely dilated.   Mild concentric left ventricular hypertrophy is present.   Estimated left ventricular ejection fraction is 30-35 %.   The right atrium appears mildly dilated.   The right ventricle exhibits mild dilation, mild diffuse hypokinesis, and   mild depression of contractility.   IVC is at the upper limits of normal and not collapsing with inspiration.   Pleural effusion - is present..   A small to moderate pericardial effusion is present.  < end of copied text >      MEDICATIONS  (STANDING):  apixaban 10 milliGRAM(s) Oral every 12 hours  atorvastatin 10 milliGRAM(s) Oral at bedtime  cefTRIAXone Injectable. 1000 milliGRAM(s) IV Push every 24 hours  diltiazem    Tablet 30 milliGRAM(s) Oral every 8 hours  furosemide   Injectable 40 milliGRAM(s) IV Push <User Schedule>  metoprolol succinate ER 50 milliGRAM(s) Oral daily    MEDICATIONS  (PRN):  acetaminophen   Tablet .. 650 milliGRAM(s) Oral every 6 hours PRN Temp greater or equal to 38C (100.4F), Mild Pain (1 - 3)  morphine  - Injectable 1 milliGRAM(s) IV Push every 3 hours PRN dyspnea    TELEMETRY REVIEW:  6/22: afib 90s    ASSESSMENT AND PLAN:    1. Acute respiratory failure w/ hypoxia multifactorial due to acute PE, CHF exacerbation, GNR PNA, Severe Pulmonary HTN  - tele monitoring. tele review as above. afib 90s  - cont. supplemental 02 -- currently using 4/5L  - COVID PCR negative, COVID19 IgG Antibody positive  - cont. Ceftriaxone / Azithromycin completed  - Blood/Urine cx NGTD | Lactate 2.1-->1.9  - Morphine PRN for dyspnea    2. Acute RLL PE with RT heart strain | Acute LLE DVT/ chronic LLE DVT   - Cont. Eliquis    3. New Onset Afib  - Afib rate controlled  - CHADSVASC: 5, started on Eliquis 10 mg bid (getting loading dose for acute PE/DVT)  - TSH wnl  - s/p cardizem gtt. -- d/c Cardizem PO d/w cardio and Cont. metoprolol (changed to tartrate 50mg BID)  - cardio f/u appreciated     4. Acute on chronic systolic CHF, EF 30-35%  - Echo as above | CXR notable for perihilar and basilar interstitial opacities R>L  - Cont. IV Lasix BID  - Daily weight / low sodium diet / FR 1500ml  - Add ACEi when BP can tolerate   - cardio f/u appreciated     5. Elevated troponin  - Likely due to demand ischemia  - Cont. statin    6. Elevated creatinine - resolved  - Did not meet criteria for UMBERTO  - Monitor renal function closely while on Lasix, avoid nephrotoxic agents    7. OA, chronic pain  - Cont. Tylenol    8. DVT PPX  - PO Eliquis     ADVANCE DIRECTIVES/ CODE STATUS: DNR/DNI - MOLST on chart. See [facility] note.    6/22 - Updates given to daughter Kalani [phone] CHIEF COMPLAINT/DIAGNOSIS: SOB    HPI: 89y female w/ pmh CAD s/p PCI x1, b/l hearing loss, pancreatic mass, HLD, HTN, OA, and spinal stenosis brought in from [facility] due to SOB, and new onset afib. Pt found to be hypoxic at 84% on RA. As per pt's daughter, pt's mental status has declined since a TIA in 2017, especially since the COVID pandemic as she has been largely isolated in her room; used to ambulate with walker but not as of recently. patient admitted to  and is being treated for Acute respiratory failure w/ hypoxia due to acute PE, CHF exacerbation, PNA, new onset afib and severe pulmonary htn.      SUBJECTIVE:  6/22 - c/o full body aches, shallow breathing.  O2 sats are 100% on 4lNC- titrate down    REVIEW OF SYSTEMS:  All other review of systems is negative unless indicated above    Vital Signs Last 24 Hrs  T(C): 36.6 (22 Jun 2020 08:55), Max: 36.7 (21 Jun 2020 17:32)  T(F): 97.8 (22 Jun 2020 08:55), Max: 98 (21 Jun 2020 17:32)  HR: 73 (22 Jun 2020 10:10) (73 - 96)  BP: 93/63 (22 Jun 2020 10:10) (93/63 - 124/74)  BP(mean): --  RR: 18 (22 Jun 2020 08:55) (18 - 18)  SpO2: 96% (22 Jun 2020 08:55) (95% - 97%) on NC    PHYSICAL EXAM:  Gen - chronic ill appearing elderly female, seated in chair, c/o feeling lousy ; NAD  Neuro: AAOx2; fluent speech, following commands, answering questions appropriately,   HEENT- PERRL, moist mucus membranes, OP clear  CV - IRIR, +murmur, No r/g  Lungs - Good effort, Clear to auscultation bilaterally  back- kyphotic  Abdomen - Soft, nontender, nondistended, +BS, No rebound/rigidity/guarding  Ext - No cyanosis/clubbing.  mild b/l edema  Skin - No rashes, No jaundice      LABS: All Labs Reviewed:                        12.4   5.29  )-----------( 167      ( 22 Jun 2020 06:41 )             41.1     06-22    137  |  103  |  34<H>  ----------------------------<  102<H>  4.1   |  30  |  1.14    Ca    9.3      22 Jun 2020 06:41  Mg     2.0     06-22        MEDICATIONS  (STANDING):  apixaban 10 milliGRAM(s) Oral every 12 hours  atorvastatin 10 milliGRAM(s) Oral at bedtime  cefTRIAXone Injectable. 1000 milliGRAM(s) IV Push every 24 hours  diltiazem    Tablet 30 milliGRAM(s) Oral every 8 hours  furosemide   Injectable 40 milliGRAM(s) IV Push <User Schedule>  metoprolol succinate ER 50 milliGRAM(s) Oral daily    MEDICATIONS  (PRN):  acetaminophen   Tablet .. 650 milliGRAM(s) Oral every 6 hours PRN Temp greater or equal to 38C (100.4F), Mild Pain (1 - 3)  morphine  - Injectable 1 milliGRAM(s) IV Push every 3 hours PRN dyspnea    TELEMETRY REVIEW:  6/22: afib 90s    ASSESSMENT AND PLAN:    1. Acute hypoxic respiratory failure due to acute PE, HCAP (GNR),  mod b/l effusions/ acute systolic CHF exacerbation  - tele monitoring. tele review as above. afib 90s  - cont. supplemental 02 -- currently using 4/5L--> titrate down  - COVID PCR negative, COVID19 IgG Antibody positive  - cont. Ceftriaxone / Azithromycin completed  - Morphine PRN for dyspnea  - eliquis for PE   - IV lasix for effusions/chf, strict I/O, daily wts  .  CT chest 6/18/20, will need repeat cxr 1-2 days    2.  Acute LLE DVT/ chronic LLE DVT   - Cont. Eliquis    3. New Onset Afib  - Afib rate controlled  - CHADSVASC: 5, started on Eliquis 10 mg bid (getting loading dose for acute PE/DVT)  - TSH wnl  - s/p cardizem gtt. -- d/c Cardizem PO d/w cardio and Cont. metoprolol (changed to tartrate 50mg BID)  - cardio f/u appreciated     4. Acute on chronic systolic CHF  - Echo 6/20- EF 30-35%, mod to severe MR, mod TR, severe pulm htn , small to mod pericardial effusion   - Cont. IV Lasix BID  - Daily weight / low sodium diet / FR 1500ml    - continue BB.  Add ACEi when BP can tolerate   - cardio f/u appreciated      5. Elevated troponin  - Likely due to demand ischemia- chf, afib, PE  - Cont. statin    6. Elevated creatinine - resolved  - Did not meet criteria for UMBERTO  - Monitor renal function closely while on Lasix, avoid nephrotoxic agents    7. OA, chronic pain  - Cont. Tylenol    8. DVT PPX  - PO Eliquis     ADVANCE DIRECTIVES/ CODE STATUS: DNR/DNI - MOLST on chart. See [facility] note.    6/22 - Updates given to daughter Kalani [phone]

## 2020-06-22 NOTE — PHYSICAL THERAPY INITIAL EVALUATION ADULT - DIAGNOSIS, PT EVAL
Acute respiratory failure w/ hypoxia due to acute PE, CHF exacerbation, PNA, new onset afib and severe pulmonary htn.

## 2020-06-22 NOTE — PHYSICAL THERAPY INITIAL EVALUATION ADULT - PERTINENT HX OF CURRENT PROBLEM, REHAB EVAL
89 yo F admitted c/o  SOB, and new onset a-fib. Pt found to be hypoxic at 84% on RA and place on supp O2. As per pt's daughter, pt's mental status has declined since a TIA in 2017, especially since the COVID pandemic as she has been largely isolated in her room; used to ambulate with walker but not as of recently. Dopplers: (+) for DVT L LE, CTA: (+) for PE in R LL.

## 2020-06-22 NOTE — PHYSICAL THERAPY INITIAL EVALUATION ADULT - PLANNED THERAPY INTERVENTIONS, PT EVAL
transfer training/bed mobility training/gait training/today: therapeutic exercises x 12 min, gait training x 12 min

## 2020-06-23 LAB
ANION GAP SERPL CALC-SCNC: 4 MMOL/L — LOW (ref 5–17)
BUN SERPL-MCNC: 29 MG/DL — HIGH (ref 7–23)
CALCIUM SERPL-MCNC: 9.5 MG/DL — SIGNIFICANT CHANGE UP (ref 8.5–10.1)
CHLORIDE SERPL-SCNC: 101 MMOL/L — SIGNIFICANT CHANGE UP (ref 96–108)
CO2 SERPL-SCNC: 30 MMOL/L — SIGNIFICANT CHANGE UP (ref 22–31)
CREAT SERPL-MCNC: 0.97 MG/DL — SIGNIFICANT CHANGE UP (ref 0.5–1.3)
CULTURE RESULTS: SIGNIFICANT CHANGE UP
CULTURE RESULTS: SIGNIFICANT CHANGE UP
GLUCOSE SERPL-MCNC: 93 MG/DL — SIGNIFICANT CHANGE UP (ref 70–99)
MAGNESIUM SERPL-MCNC: 2 MG/DL — SIGNIFICANT CHANGE UP (ref 1.6–2.6)
POTASSIUM SERPL-MCNC: 3.7 MMOL/L — SIGNIFICANT CHANGE UP (ref 3.5–5.3)
POTASSIUM SERPL-SCNC: 3.7 MMOL/L — SIGNIFICANT CHANGE UP (ref 3.5–5.3)
SARS-COV-2 RNA SPEC QL NAA+PROBE: SIGNIFICANT CHANGE UP
SODIUM SERPL-SCNC: 135 MMOL/L — SIGNIFICANT CHANGE UP (ref 135–145)
SPECIMEN SOURCE: SIGNIFICANT CHANGE UP
SPECIMEN SOURCE: SIGNIFICANT CHANGE UP

## 2020-06-23 PROCEDURE — 99233 SBSQ HOSP IP/OBS HIGH 50: CPT

## 2020-06-23 RX ORDER — ACETAMINOPHEN 500 MG
500 TABLET ORAL DAILY
Refills: 0 | Status: DISCONTINUED | OUTPATIENT
Start: 2020-06-23 | End: 2020-06-26

## 2020-06-23 RX ORDER — FUROSEMIDE 40 MG
40 TABLET ORAL DAILY
Refills: 0 | Status: DISCONTINUED | OUTPATIENT
Start: 2020-06-23 | End: 2020-06-24

## 2020-06-23 RX ADMIN — Medication 500 MILLIGRAM(S): at 16:15

## 2020-06-23 RX ADMIN — APIXABAN 10 MILLIGRAM(S): 2.5 TABLET, FILM COATED ORAL at 21:03

## 2020-06-23 RX ADMIN — SENNA PLUS 2 TABLET(S): 8.6 TABLET ORAL at 21:03

## 2020-06-23 RX ADMIN — ATORVASTATIN CALCIUM 10 MILLIGRAM(S): 80 TABLET, FILM COATED ORAL at 21:03

## 2020-06-23 RX ADMIN — Medication 50 MILLIGRAM(S): at 10:16

## 2020-06-23 RX ADMIN — Medication 50 MILLIGRAM(S): at 21:03

## 2020-06-23 RX ADMIN — APIXABAN 10 MILLIGRAM(S): 2.5 TABLET, FILM COATED ORAL at 10:15

## 2020-06-23 RX ADMIN — Medication 40 MILLIGRAM(S): at 10:16

## 2020-06-23 NOTE — PROGRESS NOTE ADULT - SUBJECTIVE AND OBJECTIVE BOX
CHIEF COMPLAINT/DIAGNOSIS: SOB    HPI: 89y female w/ pmh CAD s/p PCI x1, b/l hearing loss, pancreatic mass, HLD, HTN, OA, and spinal stenosis brought in from [facility] due to SOB, and new onset afib. Pt found to be hypoxic at 84% on RA. As per pt's daughter, pt's mental status has declined since a TIA in 2017, especially since the COVID pandemic as she has been largely isolated in her room; used to ambulate with walker but not as of recently. patient admitted to  and is being treated for Acute respiratory failure w/ hypoxia due to acute PE, CHF exacerbation, PNA, new onset afib and severe pulmonary htn.      SUBJECTIVE:  6/23 - still w/ + body pain, visibly less SOb, no acute distress. denies cp     REVIEW OF SYSTEMS:  All other review of systems is negative unless indicated above    Vital Signs Last 24 Hrs  T(C): 36.4 (23 Jun 2020 09:15), Max: 36.6 (22 Jun 2020 20:17)  T(F): 97.6 (23 Jun 2020 09:15), Max: 97.9 (22 Jun 2020 20:17)  HR: 72 (23 Jun 2020 09:15) (72 - 101)  BP: 115/75 (23 Jun 2020 09:15) (115/75 - 123/83)  BP(mean): --  RR: 18 (23 Jun 2020 09:15) (18 - 19)  SpO2: 95% (23 Jun 2020 09:15) (95% - 100%) -- NC 3/4L    PHYSICAL EXAM:  Gen - chronic ill appearing elderly female, seated in chair; NAD  Neuro: AAOx2; fluent speech, following commands, answering questions appropriately,   HEENT- PERRL, moist mucus membranes, OP clear  CV - IRIR, +murmur, No r/g  Lungs - Good effort, Clear to auscultation bilaterally  back- kyphotic  Abdomen - Soft, nontender, nondistended, +BS, No rebound/rigidity/guarding  Ext - No cyanosis/clubbing.  mild b/l edema  Skin - No rashes, No jaundice      LABS: All Labs Reviewed:             06-23    135  |  101  |  29<H>  ----------------------------<  93  3.7   |  30  |  0.97    Ca    9.5      23 Jun 2020 07:37  Mg     2.0     06-23    MEDICATIONS  (STANDING):  apixaban 10 milliGRAM(s) Oral every 12 hours  atorvastatin 10 milliGRAM(s) Oral at bedtime  cefTRIAXone Injectable. 1000 milliGRAM(s) IV Push every 24 hours  diltiazem    Tablet 30 milliGRAM(s) Oral every 8 hours  furosemide   Injectable 40 milliGRAM(s) IV Push <User Schedule>  metoprolol succinate ER 50 milliGRAM(s) Oral daily    MEDICATIONS  (PRN):  acetaminophen   Tablet .. 650 milliGRAM(s) Oral every 6 hours PRN Temp greater or equal to 38C (100.4F), Mild Pain (1 - 3)  morphine  - Injectable 1 milliGRAM(s) IV Push every 3 hours PRN dyspnea    TELEMETRY REVIEW:  6/22: afib 90s  6/23: afib 80-90s    ASSESSMENT AND PLAN:    1. Acute hypoxic respiratory failure due to acute PE, HCAP (GNR),  mod b/l effusions/ acute systolic CHF exacerbation  - tele monitoring. tele review as above. afib 90s  - cont. supplemental 02 -- currently using 4/5L--> titrate down  - COVID PCR negative, COVID19 IgG Antibody positive  - cont. Ceftriaxone / Azithromycin completed  - Morphine PRN for dyspnea  - eliquis for PE   - IV lasix for effusions/chf, strict I/O, daily wts  .  CT chest 6/18/20, will need repeat cxr 1-2 days    2.  Acute LLE DVT/ chronic LLE DVT   - Cont. Eliquis    3. New Onset Afib  - Afib rate controlled  - CHADSVASC: 5, started on Eliquis 10 mg bid (getting loading dose for acute PE/DVT)  - TSH wnl  - s/p cardizem gtt. -- d/c Cardizem PO d/w cardio and Cont. metoprolol (changed to tartrate 50mg BID)  - cardio f/u appreciated     4. Acute on chronic systolic CHF  - Echo 6/20- EF 30-35%, mod to severe MR, mod TR, severe pulm htn , small to mod pericardial effusion   - Cont. IV Lasix BID  - Daily weight / low sodium diet / FR 1500ml    - continue BB.  Add ACEi when BP can tolerate   - cardio f/u appreciated      5. Elevated troponin  - Likely due to demand ischemia- chf, afib, PE  - Cont. statin    6. Elevated creatinine - resolved  - Did not meet criteria for UMBERTO  - Monitor renal function closely while on Lasix, avoid nephrotoxic agents    7. OA, chronic pain  - Cont. Tylenol    8. DVT PPX  - PO Eliquis     ADVANCE DIRECTIVES/ CODE STATUS: DNR/DNI - MOLST on chart. See [facility] note.    6/22 - Updates given to daughter Kalani [phone] CHIEF COMPLAINT/DIAGNOSIS: SOB    HPI: 89y female w/ pmh CAD s/p PCI x1, b/l hearing loss, pancreatic mass, HLD, HTN, OA, and spinal stenosis brought in from [facility] due to SOB, and new onset afib. Pt found to be hypoxic at 84% on RA. As per pt's daughter, pt's mental status has declined since a TIA in 2017, especially since the COVID pandemic as she has been largely isolated in her room; used to ambulate with walker but not as of recently. patient admitted to  and is being treated for Acute respiratory failure w/ hypoxia due to acute PE, CHF exacerbation, PNA, new onset afib and severe pulmonary htn.      SUBJECTIVE:  6/23 - still w/ + body pain, visibly less SOb, no acute distress. denies cp     REVIEW OF SYSTEMS:  All other review of systems is negative unless indicated above    Vital Signs Last 24 Hrs  T(C): 36.4 (23 Jun 2020 09:15), Max: 36.6 (22 Jun 2020 20:17)  T(F): 97.6 (23 Jun 2020 09:15), Max: 97.9 (22 Jun 2020 20:17)  HR: 72 (23 Jun 2020 09:15) (72 - 101)  BP: 115/75 (23 Jun 2020 09:15) (115/75 - 123/83)  BP(mean): --  RR: 18 (23 Jun 2020 09:15) (18 - 19)  SpO2: 95% (23 Jun 2020 09:15) (95% - 100%) -- NC 3/4L    PHYSICAL EXAM:  Gen - chronic ill appearing elderly female, seated in chair; NAD  Neuro: AAOx2; fluent speech, following commands, answering questions appropriately,   HEENT- PERRL, moist mucus membranes, OP clear  CV - IRIR, +murmur, No r/g  Lungs - Good effort, Clear to auscultation bilaterally  back- kyphotic  Abdomen - Soft, nontender, nondistended, +BS, No rebound/rigidity/guarding  Ext - No cyanosis/clubbing.  mild b/l edema  Skin - No rashes, No jaundice      LABS: All Labs Reviewed:             06-23    135  |  101  |  29<H>  ----------------------------<  93  3.7   |  30  |  0.97    Ca    9.5      23 Jun 2020 07:37  Mg     2.0     06-23    MEDICATIONS  (STANDING):  apixaban 10 milliGRAM(s) Oral every 12 hours  atorvastatin 10 milliGRAM(s) Oral at bedtime  cefTRIAXone Injectable. 1000 milliGRAM(s) IV Push every 24 hours  diltiazem    Tablet 30 milliGRAM(s) Oral every 8 hours  furosemide   Injectable 40 milliGRAM(s) IV Push <User Schedule>  metoprolol succinate ER 50 milliGRAM(s) Oral daily    MEDICATIONS  (PRN):  acetaminophen   Tablet .. 650 milliGRAM(s) Oral every 6 hours PRN Temp greater or equal to 38C (100.4F), Mild Pain (1 - 3)  morphine  - Injectable 1 milliGRAM(s) IV Push every 3 hours PRN dyspnea    TELEMETRY REVIEW:  6/22: afib 90s  6/23: afib 80-90s    ASSESSMENT AND PLAN:    1. Acute hypoxic respiratory failure due to acute PE, HCAP (GNR),  mod b/l effusions/ acute systolic CHF exacerbation  - tele monitoring. tele review as above. afib 90s  - cont. supplemental 02 -- currently using 3/4L--> titrate down  - COVID PCR negative, COVID19 IgG Antibody positive  - cont. Ceftriaxone / Azithromycin completed  - Morphine PRN for dyspnea  - eliquis for PE   - IV lasix for effusions/chf, strict I/O, daily wts  .  CT chest 6/18/20, repeat CXR in AM -- switched to PO lasix 40mg daily d/w cardio    2.  Acute LLE DVT/ chronic LLE DVT   - Cont. Eliquis    3. New Onset Afib  - Afib rate controlled  - CHADSVASC: 5, started on Eliquis 10 mg bid (getting loading dose for acute PE/DVT)  - TSH wnl  - s/p cardizem gtt. -- d/c Cardizem PO d/w cardio and Cont. metoprolol (changed to tartrate 50mg BID)  - cardio f/u appreciated     4. Acute on chronic systolic CHF  - Echo 6/20- EF 30-35%, mod to severe MR, mod TR, severe pulm htn , small to mod pericardial effusion   - Cont. IV Lasix BID - switched to PO  - Daily weight / low sodium diet / FR 1500ml    - continue BB.  Add ACEi when BP can tolerate   - cardio f/u appreciated      5. Elevated troponin  - Likely due to demand ischemia- chf, afib, PE  - Cont. statin    6. Elevated creatinine - resolved  - Did not meet criteria for UMBERTO  - Monitor renal function closely while on Lasix, avoid nephrotoxic agents    7. OA, chronic pain  - Cont. Tylenol    8. DVT PPX  - PO Eliquis     ADVANCE DIRECTIVES/ CODE STATUS: DNR/DNI - MOLST on chart. See [facility] note.    6/23 - LM for daughter Kalani [phone] CHIEF COMPLAINT/DIAGNOSIS: SOB    HPI: 89y female w/ pmh CAD s/p PCI x1, b/l hearing loss, pancreatic mass, HLD, HTN, OA, and spinal stenosis brought in from [facility] due to SOB, and new onset afib. Pt found to be hypoxic at 84% on RA. As per pt's daughter, pt's mental status has declined since a TIA in 2017, especially since the COVID pandemic as she has been largely isolated in her room; used to ambulate with walker but not as of recently. patient admitted to  and is being treated for Acute respiratory failure w/ hypoxia due to acute PE, CHF exacerbation, PNA, new onset afib and severe pulmonary htn.      SUBJECTIVE:  6/23 - still w/ + body pain, visibly less SOb, no acute distress. denies cp     REVIEW OF SYSTEMS:  All other review of systems is negative unless indicated above    Vital Signs Last 24 Hrs  T(C): 36.4 (23 Jun 2020 09:15), Max: 36.6 (22 Jun 2020 20:17)  T(F): 97.6 (23 Jun 2020 09:15), Max: 97.9 (22 Jun 2020 20:17)  HR: 72 (23 Jun 2020 09:15) (72 - 101)  BP: 115/75 (23 Jun 2020 09:15) (115/75 - 123/83)  BP(mean): --  RR: 18 (23 Jun 2020 09:15) (18 - 19)  SpO2: 95% (23 Jun 2020 09:15) (95% - 100%) -- NC 3/4L    PHYSICAL EXAM:  Gen - chronic ill appearing elderly female, seated in chair; NAD  Neuro: AAOx2; fluent speech, following commands, answering questions appropriately,   HEENT- PERRL, moist mucus membranes, OP clear  CV - IRIR, +murmur, No r/g  Lungs - Good effort, Clear to auscultation bilaterally  back- kyphotic  Abdomen - Soft, nontender, nondistended, +BS, No rebound/rigidity/guarding  Ext - No cyanosis/clubbing.  mild b/l edema  Skin - No rashes, No jaundice      LABS: All Labs Reviewed:             06-23    135  |  101  |  29<H>  ----------------------------<  93  3.7   |  30  |  0.97    Ca    9.5      23 Jun 2020 07:37  Mg     2.0     06-23    MEDICATIONS  (STANDING):  acetaminophen   Tablet .. 500 milliGRAM(s) Oral daily  apixaban 10 milliGRAM(s) Oral every 12 hours  atorvastatin 10 milliGRAM(s) Oral at bedtime  furosemide    Tablet 40 milliGRAM(s) Oral daily  metoprolol tartrate 50 milliGRAM(s) Oral two times a day  senna 2 Tablet(s) Oral at bedtime    MEDICATIONS  (PRN):  acetaminophen   Tablet .. 650 milliGRAM(s) Oral every 6 hours PRN Temp greater or equal to 38C (100.4F), Mild Pain (1 - 3)  morphine  - Injectable 1 milliGRAM(s) IV Push every 3 hours PRN dyspnea      TELEMETRY REVIEW:  6/22: afib 90s  6/23: afib 80-90s    ASSESSMENT AND PLAN:    1. Acute hypoxic respiratory failure due to acute PE, HCAP (GNR),  mod b/l effusions/ acute systolic CHF exacerbation  - tele monitoring. tele review as above. afib 90s  - cont. supplemental 02 -- currently using 3/4L--> titrate down  - COVID PCR negative, COVID19 IgG Antibody positive  - cont. Ceftriaxone / Azithromycin completed  - Morphine PRN for dyspnea  - eliquis for PE   - IV lasix for effusions/chf, strict I/O, daily wts  .  CT chest 6/18/20, repeat CXR in AM -- switched to PO lasix 40mg daily d/w cardio    2.  Acute LLE DVT/ chronic LLE DVT   - Cont. Eliquis    3. New Onset Afib  - Afib rate controlled  - CHADSVASC: 5, started on Eliquis 10 mg bid (getting loading dose for acute PE/DVT)  - TSH wnl  - s/p cardizem gtt. -- d/c Cardizem PO d/w cardio and Cont. metoprolol (changed to tartrate 50mg BID)  - cardio f/u appreciated     4. Acute on chronic systolic CHF  - Echo 6/20- EF 30-35%, mod to severe MR, mod TR, severe pulm htn , small to mod pericardial effusion   - Cont. IV Lasix BID - switched to PO  - Daily weight / low sodium diet / FR 1500ml    - continue BB.  Add ACEi when BP can tolerate   - cardio f/u appreciated      5. Elevated troponin  - Likely due to demand ischemia- chf, afib, PE  - Cont. statin    6. Elevated creatinine - resolved  - Did not meet criteria for UMBERTO  - Monitor renal function closely while on Lasix, avoid nephrotoxic agents    7. OA, chronic pain  - Cont. Tylenol    8. DVT PPX  - PO Eliquis     ADVANCE DIRECTIVES/ CODE STATUS: DNR/DNI - MOLST on chart. See [facility] note.    6/23 - LM for daughter Kalani [phone]

## 2020-06-23 NOTE — PROGRESS NOTE ADULT - SUBJECTIVE AND OBJECTIVE BOX
CHIEF COMPLAINT:    HPI:  89 y/o F PMHx significant for ASHD s/p PCI x1, b/l hearing loss, pancreatic mass, HLD, HTN, OA, and spinal stenosis brought in from [facility] due to SOB, and new onset afib. Pt found to be hypoxic at 84% on RA and place on supp O2. Unable to obtain proper history from pt due to confusion. Pt opens eyes to commands and denies pain. As per pt's daughter, pt's mental status has declined since a TIA in 2017, especially since the COVID pandemic as she has been largely isolated in her room; used to ambulate with walker but not as of recently. Recently got a 24 hour aide this week. Saw pt 2 weeks ago and noticed the increased b/l LE swelling. There has been no official diagnosis of dementia made but has been discussed with pt's PCP. Daughter is not aware if pt had fever or cough prior to ED arrival.     In the ED, CXR showing cardiomegaly with b/l perihilar and basilar airspace consolidations. BNP 6133, D-Dimer 708, Lactate 2.1-->1.9 s/p IVF, Lasix 40 IV x1, CTX and Azt x1 dose, mildly elevated trops 0.058-->0.053  Pt in Afib w/ RVR 's, given Cardizem 10mg IV x1 dose, rate improved to 100's (17 Jun 2020 22:52)    I have not seen her in office since 2016. Venous Doppler showed L DVT. Pt started on Eliquis. Trop mildly elevated, due to demand ischemia. No EKG available. AF on monitor with -120.     6/19: AF with VR 70's on IV Cardizem 2.5 mg/hr. BP about 120/70. Echo showed LVEF 30-35%, with LVH, mild AS, 3+MR, 2+TR, dilated LA. I switched pt to oral Cardizem. Would continue BB. No ACE or ARB due to CKD. No further W/U at her age. 4 years ago pt refused BX of her pancreatic mass.     6/23: Pt switched to PO Lopressor, and Cardizem D/Amari. Switch to PO Lasix. Prognosis poor. AF with VR 90's on monitor.      PAST MEDICAL & SURGICAL HISTORY:  Transient ischemic attack (TIA): 2017  Hypercholesterolemia  Hypertension, unspecified type  PVCs (premature ventricular contractions)  Deviated septum  Pancreatic mass  Arteriosclerotic heart disease (ASHD)  Spinal stenosis, unspecified spinal region  Osteoarthritis, unspecified osteoarthritis type, unspecified site  History of coronary artery stent placement: approx 2005      Allergies    codeine (Other)    Intolerances        Occupation:  Alochol: Denied  Smoking: Denied  Drug Use: Denied  Marital Status:         FAMILY HISTORY:  No pertinent family history in first degree relatives      REVIEW OF SYSTEMS:    CONSTITUTIONAL: No weakness, fevers or chills  EYES/ENT: No visual changes;  No vertigo or throat pain   NECK: No pain or stiffness  RESPIRATORY: No cough, wheezing, hemoptysis; No shortness of breath  CARDIOVASCULAR: No chest pain or palpitations  GASTROINTESTINAL: No abdominal or epigastric pain. No nausea, vomiting, or hematemesis; No diarrhea or constipation. No melena or hematochezia.  GENITOURINARY: No dysuria, frequency or hematuria  NEUROLOGICAL: No numbness or weakness  SKIN: No itching, burning, rashes, or lesions   All other review of systems is negative unless indicated above    Vital Signs Last 24 Hrs  T(C): 36.7 (18 Jun 2020 02:21), Max: 36.9 (17 Jun 2020 18:00)  T(F): 98.1 (18 Jun 2020 02:21), Max: 98.5 (17 Jun 2020 18:00)  HR: 113 (18 Jun 2020 02:21) (68 - 132)  BP: 124/100 (18 Jun 2020 02:21) (102/65 - 136/99)  BP(mean): --  RR: 18 (18 Jun 2020 02:21) (16 - 18)  SpO2: 100% (18 Jun 2020 02:21) (84% - 100%)    I&O's Summary      PHYSICAL EXAM:    Constitutional: NAD, awake and alert, well-developed  HEENT: PERR, EOMI,  No oral cyananosis.  Neck:  supple,  No JVD  Respiratory: Breath sounds are clear bilaterally, No wheezing, rales or rhonchi  Cardiovascular: S1 and S2, regular rate and rhythm, no Murmurs, gallops or rubs  Gastrointestinal: Bowel Sounds present, soft, nontender.   Extremities: No peripheral edema. No clubbing or cyanosis.  Vascular: 2+ peripheral pulses  Neurological: A/O x 3, no focal deficits  Musculoskeletal: no calf tenderness.  Skin: No rashes.    MEDICATIONS:  MEDICATIONS  (STANDING):  atorvastatin 10 milliGRAM(s) Oral at bedtime  azithromycin   Tablet 500 milliGRAM(s) Oral daily  cefTRIAXone Injectable. 1000 milliGRAM(s) IV Push every 24 hours  furosemide   Injectable 40 milliGRAM(s) IV Push <User Schedule>  metoprolol succinate ER 50 milliGRAM(s) Oral daily      LABS: All Labs Reviewed:                        14.2   5.17  )-----------( 162      ( 18 Jun 2020 07:32 )             45.8                         14.0   5.60  )-----------( 196      ( 17 Jun 2020 18:20 )             45.9     18 Jun 2020 07:32    140    |  105    |  32     ----------------------------<  110    4.3     |  28     |  1.31   17 Jun 2020 18:20    138    |  106    |  31     ----------------------------<  129    4.3     |  25     |  1.25     Ca    9.5        18 Jun 2020 07:32  Ca    9.4        17 Jun 2020 18:20  Phos  3.5       18 Jun 2020 07:32  Mg     2.2       18 Jun 2020 07:32    TPro  7.1    /  Alb  2.9    /  TBili  1.0    /  DBili  x      /  AST  38     /  ALT  29     /  AlkPhos  139    17 Jun 2020 18:20    PT/INR - ( 17 Jun 2020 18:20 )   PT: 12.4 sec;   INR: 1.11 ratio         PTT - ( 17 Jun 2020 18:20 )  PTT:29.9 sec  CARDIAC MARKERS ( 18 Jun 2020 02:54 )  0.071 ng/mL / x     / x     / x     / x      CARDIAC MARKERS ( 17 Jun 2020 22:56 )  0.053 ng/mL / x     / x     / x     / x      CARDIAC MARKERS ( 17 Jun 2020 18:20 )  0.058 ng/mL / x     / x     / x     / x          Blood Culture:   06-17 @ 18:20  Pro Bnp 6133    06-18 @ 07:32  TSH: 1.10      RADIOLOGY/EKG:

## 2020-06-24 ENCOUNTER — TRANSCRIPTION ENCOUNTER (OUTPATIENT)
Age: 85
End: 2020-06-24

## 2020-06-24 LAB
ANION GAP SERPL CALC-SCNC: 2 MMOL/L — LOW (ref 5–17)
BUN SERPL-MCNC: 38 MG/DL — HIGH (ref 7–23)
CALCIUM SERPL-MCNC: 9.3 MG/DL — SIGNIFICANT CHANGE UP (ref 8.5–10.1)
CHLORIDE SERPL-SCNC: 99 MMOL/L — SIGNIFICANT CHANGE UP (ref 96–108)
CO2 SERPL-SCNC: 35 MMOL/L — HIGH (ref 22–31)
CREAT SERPL-MCNC: 1.09 MG/DL — SIGNIFICANT CHANGE UP (ref 0.5–1.3)
GLUCOSE SERPL-MCNC: 97 MG/DL — SIGNIFICANT CHANGE UP (ref 70–99)
POTASSIUM SERPL-MCNC: 4.4 MMOL/L — SIGNIFICANT CHANGE UP (ref 3.5–5.3)
POTASSIUM SERPL-SCNC: 4.4 MMOL/L — SIGNIFICANT CHANGE UP (ref 3.5–5.3)
SODIUM SERPL-SCNC: 136 MMOL/L — SIGNIFICANT CHANGE UP (ref 135–145)

## 2020-06-24 PROCEDURE — 71045 X-RAY EXAM CHEST 1 VIEW: CPT | Mod: 26

## 2020-06-24 PROCEDURE — 99233 SBSQ HOSP IP/OBS HIGH 50: CPT

## 2020-06-24 RX ORDER — APIXABAN 2.5 MG/1
5 TABLET, FILM COATED ORAL EVERY 12 HOURS
Refills: 0 | Status: DISCONTINUED | OUTPATIENT
Start: 2020-06-25 | End: 2020-06-26

## 2020-06-24 RX ORDER — HYDROCORTISONE 1 %
1 OINTMENT (GRAM) TOPICAL
Qty: 0 | Refills: 0 | DISCHARGE

## 2020-06-24 RX ORDER — FUROSEMIDE 40 MG
40 TABLET ORAL
Refills: 0 | Status: DISCONTINUED | OUTPATIENT
Start: 2020-06-24 | End: 2020-06-25

## 2020-06-24 RX ORDER — APIXABAN 2.5 MG/1
1 TABLET, FILM COATED ORAL
Qty: 0 | Refills: 0 | DISCHARGE
Start: 2020-06-24

## 2020-06-24 RX ADMIN — Medication 50 MILLIGRAM(S): at 22:10

## 2020-06-24 RX ADMIN — Medication 50 MILLIGRAM(S): at 10:51

## 2020-06-24 RX ADMIN — APIXABAN 10 MILLIGRAM(S): 2.5 TABLET, FILM COATED ORAL at 10:51

## 2020-06-24 RX ADMIN — APIXABAN 10 MILLIGRAM(S): 2.5 TABLET, FILM COATED ORAL at 22:10

## 2020-06-24 RX ADMIN — Medication 500 MILLIGRAM(S): at 10:52

## 2020-06-24 RX ADMIN — Medication 40 MILLIGRAM(S): at 10:51

## 2020-06-24 RX ADMIN — SENNA PLUS 2 TABLET(S): 8.6 TABLET ORAL at 22:10

## 2020-06-24 RX ADMIN — ATORVASTATIN CALCIUM 10 MILLIGRAM(S): 80 TABLET, FILM COATED ORAL at 22:10

## 2020-06-24 NOTE — DISCHARGE NOTE PROVIDER - NSDCMRMEDTOKEN_GEN_ALL_CORE_FT
apixaban 5 mg oral tablet: 1 tab(s) orally every 12 hours  atorvastatin 10 mg oral tablet: 1 tab(s) orally once a day  furosemide 20 mg oral tablet: 1 tab(s) orally once a day  Metoprolol Succinate ER 50 mg oral tablet, extended release: 1 tab(s) orally once a day apixaban 5 mg oral tablet: 1 tab(s) orally every 12 hours  atorvastatin 10 mg oral tablet: 1 tab(s) orally once a day  furosemide 20 mg oral tablet: 2 tab(s) orally once a day  hydrocortisone 1% topical cream: 1 application topically 2 times a day  metoprolol succinate 50 mg oral tablet, extended release: 1 tab(s) orally once a day  pantoprazole 40 mg oral delayed release tablet: 1 tab(s) orally once a day apixaban 5 mg oral tablet: 1 tab(s) orally every 12 hours  atorvastatin 10 mg oral tablet: 1 tab(s) orally once a day  furosemide 20 mg oral tablet: 2 tab(s) orally once a day  hydrocortisone 1% topical cream: 1 application topically 2 times a day  metoprolol succinate 50 mg oral tablet, extended release: 1 tab(s) orally once a day  morphine 20 mg/mL oral concentrate: 0.13 milliliter(s) orally every 4 hours, As needed, SOB or pain  pantoprazole 40 mg oral delayed release tablet: 1 tab(s) orally once a day

## 2020-06-24 NOTE — DISCHARGE NOTE PROVIDER - CARE PROVIDER_API CALL
Dionicio Mclean  FAMILY MEDICINE  [address] [place] [address]  Phone: [phone]  Fax: [phone]  Follow Up Time:     Geo Campbell  CARDIOVASCULAR DISEASE  [address] [place] [address]  Phone: [phone]  Fax: [phone]  Follow Up Time:

## 2020-06-24 NOTE — DISCHARGE NOTE PROVIDER - CARE PROVIDERS DIRECT ADDRESSES
,[email or web address].[facility]

## 2020-06-24 NOTE — PROGRESS NOTE ADULT - SUBJECTIVE AND OBJECTIVE BOX
CHIEF COMPLAINT/DIAGNOSIS: SOB    HPI: 89y female w/ pmh CAD s/p PCI x1, b/l hearing loss, pancreatic mass, HLD, HTN, OA, and [facility], and new onset afib. Pt found to be hypoxic at 84% on RA. As per pt's daughter, pt's mental status has declined since a TIA in 2017, especially since the COVID pandemic as she has been largely isolated in her room; used to ambulate with walker but not as of recently. patient admitted to  and is being treated for Acute respiratory failure w/ hypoxia due to acute PE, CHF exacerbation, PNA, new onset afib and severe pulmonary htn.      SUBJECTIVE:  6/23 - still w/ + body pain, visibly less SOB, no acute distress. denies cp   6/24 - no distress, upright in chair, edema ble present, still + sob    REVIEW OF SYSTEMS:  All other review of systems is negative unless indicated above    Vital Signs Last 24 Hrs  T(C): 36.3 (24 Jun 2020 09:35), Max: 36.3 (24 Jun 2020 09:35)  T(F): 97.4 (24 Jun 2020 09:35), Max: 97.4 (24 Jun 2020 09:35)  HR: 94 (24 Jun 2020 09:35) (94 - 107)  BP: 122/89 (24 Jun 2020 09:35) (100/61 - 122/89)  BP(mean): --  RR: 18 (24 Jun 2020 09:35) (18 - 18)  SpO2: 98% (24 Jun 2020 09:35) (98% - 99%) -- 3L NC    PHYSICAL EXAM:  Gen - chronic ill appearing elderly female, seated in chair; NAD  Neuro: AAOx2; fluent speech, following commands, answering questions appropriately,   HEENT- PERRL, moist mucus membranes, OP clear  CV - IRIR, +murmur, No r/g  Lungs - Good effort, Clear to auscultation bilaterally  back- kyphotic  Abdomen - Soft, nontender, nondistended, +BS, No rebound/rigidity/guarding  Ext - No cyanosis/clubbing.  mild b/l edema  Skin - No rashes, No jaundice      LABS: All Labs Reviewed:    06-24    136  |  99  |  38<H>  ----------------------------<  97  4.4   |  35<H>  |  1.09    Ca    9.3      24 Jun 2020 07:59  Mg     2.0     06-23      MEDICATIONS  (STANDING):  acetaminophen   Tablet .. 500 milliGRAM(s) Oral daily  apixaban 10 milliGRAM(s) Oral every 12 hours  atorvastatin 10 milliGRAM(s) Oral at bedtime  furosemide   Injectable 40 milliGRAM(s) IV Push two times a day  metoprolol tartrate 50 milliGRAM(s) Oral two times a day  senna 2 Tablet(s) Oral at bedtime    MEDICATIONS  (PRN):  acetaminophen   Tablet .. 650 milliGRAM(s) Oral every 6 hours PRN Temp greater or equal to 38C (100.4F), Mild Pain (1 - 3)  morphine  - Injectable 1 milliGRAM(s) IV Push every 3 hours PRN dyspnea    TELEMETRY REVIEW:  6/22: afib 90s  6/23: afib 80-90s  6/24 - afib 120s briefly       ASSESSMENT AND PLAN:    1. Acute hypoxic respiratory failure due to acute PE, HCAP (GNR),  mod b/l effusions/ acute systolic CHF exacerbation  - tele monitoring. tele review as above. afib 90s  - cont. supplemental 02 -- currently using 3/4L--> titrate down  - COVID PCR negative, COVID19 IgG Antibody positive  - cont. Ceftriaxone / Azithromycin completed  - Morphine PRN for dyspnea  - eliquis for PE   - IV lasix for effusions/ chf, Strict I/O, daily wts  .  CT chest 6/18/20, repeat CXR in AM -- switched to PO lasix 40mg daily d/w cardio  6/24 - repeat cxr w/ progression of small pleural effusions, will given extra 40mg Iv lasix tonight and resume IV Lasix 40mg BID    2.  Acute LLE DVT/ chronic LLE DVT   - Cont. Eliquis    3. New Onset Afib  - Afib rate controlled  - CHADSVASC: 5, started on Eliquis 10 mg bid (getting loading dose for acute PE/DVT) -- will start 5mg tm.  - TSH wnl  - s/p cardizem gtt. -- d/c Cardizem PO d/w cardio and Cont. metoprolol (changed to tartrate 50mg BID)  - cardio f/u appreciated     4. Acute on chronic systolic CHF  - Echo 6/20- EF 30-35%, mod to severe MR, mod TR, severe pulm htn , small to mod pericardial effusion   - Cont. Lasix  - Daily weight / low sodium diet / FR 1500ml    - continue BB.  Add ACEi when BP can tolerate   - cardio f/u appreciated      5. Elevated troponin  - Likely due to demand ischemia- chf, afib, PE  - Cont. statin    6. Elevated creatinine - resolved  - Did not meet criteria for UMBERTO  - Monitor renal function closely while on Lasix, avoid nephrotoxic agents    7. OA, chronic pain  - Cont. Tylenol    8. DVT PPX  - PO Eliquis     ADVANCE DIRECTIVES/ CODE STATUS: DNR/DNI - MOLST on chart. See [facility] note.    6/24 - LM for daughter Kalani [phone] w/ updates CHIEF COMPLAINT/DIAGNOSIS: SOB    HPI: 89y female w/ pmh CAD s/p PCI x1, b/l hearing loss, pancreatic mass, HLD, HTN, OA, and spinal stenosis brought in from [facility] due to SOB, and new onset afib. Pt found to be hypoxic at 84% on RA. As per pt's daughter, pt's mental status has declined since a TIA in 2017, especially since the COVID pandemic as she has been largely isolated in her room; used to ambulate with walker but not as of recently. patient admitted to  and is being treated for Acute respiratory failure w/ hypoxia due to acute PE, CHF exacerbation, PNA, new onset afib and severe pulmonary htn.      SUBJECTIVE:  6/23 - still w/ + body pain, visibly less SOB, no acute distress. denies cp   6/24 - no distress, upright in chair, edema present, still + sob    REVIEW OF SYSTEMS:  All other review of systems is negative unless indicated above    Vital Signs Last 24 Hrs  T(C): 36.3 (24 Jun 2020 09:35), Max: 36.3 (24 Jun 2020 09:35)  T(F): 97.4 (24 Jun 2020 09:35), Max: 97.4 (24 Jun 2020 09:35)  HR: 94 (24 Jun 2020 09:35) (94 - 107)  BP: 122/89 (24 Jun 2020 09:35) (100/61 - 122/89)  BP(mean): --  RR: 18 (24 Jun 2020 09:35) (18 - 18)  SpO2: 98% (24 Jun 2020 09:35) (98% - 99%) -- 3L NC    PHYSICAL EXAM:  Gen - chronic ill appearing elderly female, seated in chair; NAD  Neuro: AAOx2; fluent speech, following commands, answering questions appropriately,   HEENT- PERRL, moist mucus membranes, OP clear  CV - IRIR, +murmur, No r/g  Lungs - Good effort, Clear to auscultation bilaterally  back- kyphotic  Abdomen - Soft, nontender, nondistended, +BS, No rebound/rigidity/guarding  Ext - No cyanosis/clubbing.  mild b/l edema  Skin - No rashes, No jaundice      LABS: All Labs Reviewed:    06-24    136  |  99  |  38<H>  ----------------------------<  97  4.4   |  35<H>  |  1.09    Ca    9.3      24 Jun 2020 07:59  Mg     2.0     06-23          MEDICATIONS  (STANDING):  acetaminophen   Tablet .. 500 milliGRAM(s) Oral daily  apixaban 10 milliGRAM(s) Oral every 12 hours  atorvastatin 10 milliGRAM(s) Oral at bedtime  furosemide   Injectable 40 milliGRAM(s) IV Push two times a day  metoprolol tartrate 50 milliGRAM(s) Oral two times a day  senna 2 Tablet(s) Oral at bedtime    MEDICATIONS  (PRN):  acetaminophen   Tablet .. 650 milliGRAM(s) Oral every 6 hours PRN Temp greater or equal to 38C (100.4F), Mild Pain (1 - 3)  morphine  - Injectable 1 milliGRAM(s) IV Push every 3 hours PRN dyspnea    TELEMETRY REVIEW:  6/22: afib 90s  6/23: afib 80-90s  6/24 - afib 120s briefly       ASSESSMENT AND PLAN:    1. Acute hypoxic respiratory failure due to acute PE, HCAP (GNR),  mod b/l effusions/ acute systolic CHF exacerbation  - tele monitoring. tele review as above. afib 90s  - cont. supplemental 02 -- currently using 3/4L--> titrate down  - COVID PCR negative, COVID19 IgG Antibody positive  - cont. Ceftriaxone / Azithromycin completed  - Morphine PRN for dyspnea  - eliquis for PE   - IV lasix for effusions/ chf, Strict I/O, daily wts  .  CT chest 6/18/20, repeat CXR in AM -- switched to PO lasix 40mg daily d/w cardio  6/24 - repeat cxr w/ progression of small pleural effusions, will given extra 40mg Iv lasix tonight and resume IV Lasix 40mg BID    2.  Acute LLE DVT/ chronic LLE DVT   - Cont. Eliquis    3. New Onset Afib  - Afib rate controlled  - CHADSVASC: 5, started on Eliquis 10 mg bid (getting loading dose for acute PE/DVT) -- will start 5mg   - TSH wnl  - s/p cardizem gtt. -- d/c Cardizem PO d/w cardio and Cont. metoprolol (changed to tartrate 50mg BID)  - cardio f/u appreciated     4. Acute on chronic systolic CHF, pleural effusions  as above-  continue iv lasix   - Echo 6/20- EF 30-35%, mod to severe MR, mod TR, severe pulm htn , small to mod pericardial effusion   - Daily weight / low sodium diet / FR 1500ml    - continue BB.  Add ACEi when BP can tolerate   - cardio f/u appreciated      5. Elevated troponin  - Likely due to demand ischemia- chf, afib, PE  - Cont. statin    6. Elevated creatinine - resolved  - Did not meet criteria for UMBERTO  - Monitor renal function closely while on Lasix, avoid nephrotoxic agents    7. OA, chronic pain  - Cont. Tylenol    8. DVT PPX  - PO Eliquis     ADVANCE DIRECTIVES/ CODE STATUS: DNR/DNI - MOLST on chart. See [facility] note.    6/24 - LM for daughter Kalani [phone] w/ updates

## 2020-06-24 NOTE — DISCHARGE NOTE PROVIDER - NSDCCPCAREPLAN_GEN_ALL_CORE_FT
PRINCIPAL DISCHARGE DIAGNOSIS  Diagnosis: Afib  Assessment and Plan of Treatment: You were admitted due to rapid atrial fibrillation which is a cardiac arrythmia  - Continue to take metoprolol to control your rate  - Continue to take Eliquis for stroke prevention  - Follow up with your cardiologist Dr. Campbell      SECONDARY DISCHARGE DIAGNOSES  Diagnosis: HF (heart failure)  Assessment and Plan of Treatment: You had an acute exacerbation of your heart failure  - Continue to monitor your weights at home daily  - Maintain a low sodium diet and a fluid restriction of 1500mL per day  - Continue to take Lasix 40mg daily  - Follow up with your cardiologist Dr. Campbell, Add ACEi when BP can tolerate   -  Your discharge weight is    Diagnosis: Acute pulmonary embolism  Assessment and Plan of Treatment: You were found to have acute pulmonary embolisms and an acute left lower extremity DVT (deep vein thrombosis)  - Continue Eliquis 5mg twice a day (starting 6/25 AM)    Diagnosis: PNA (pneumonia)  Assessment and Plan of Treatment: - You completed your course of IV antibiotics PRINCIPAL DISCHARGE DIAGNOSIS  Diagnosis: Afib  Assessment and Plan of Treatment: You were admitted due to rapid atrial fibrillation which is a cardiac arrythmia  - Continue to take metoprolol to control your rate  - Continue to take Eliquis for stroke prevention  - Follow up with your cardiologist Dr. Campbell      SECONDARY DISCHARGE DIAGNOSES  Diagnosis: GI bleed  Assessment and Plan of Treatment: You have blood in the stool  - Your labs are stable. As per your family wish no further interventions were advised. Montior for signs of bleeding on Eliquis    Diagnosis: Acute pulmonary embolism  Assessment and Plan of Treatment: You were found to have acute pulmonary embolisms and an acute left lower extremity DVT (deep vein thrombosis)  - Continue Eliquis 5mg twice a day    Diagnosis: HF (heart failure)  Assessment and Plan of Treatment: You had an acute exacerbation of your heart failure  - Continue to monitor your weights daily  - Maintain a low sodium diet and a fluid restriction of 1500mL per day  - Continue to take Lasix 40mg daily  - Follow up with your cardiologist Dr. Campbell    Diagnosis: PNA (pneumonia)  Assessment and Plan of Treatment: - You completed your course of IV antibiotics PRINCIPAL DISCHARGE DIAGNOSIS  Diagnosis: Afib  Assessment and Plan of Treatment: You were admitted due to rapid atrial fibrillation which is a cardiac arrythmia  - Continue to take metoprolol to control your rate  - Continue to take Eliquis for stroke prevention  - Follow up with your cardiologist Dr. Campbell      SECONDARY DISCHARGE DIAGNOSES  Diagnosis: GI bleed  Assessment and Plan of Treatment: You have blood in the stool  - Your labs are stable (hemoglobin 10 this morning and repeat). As per your family wish no further interventions were advised. Reinaldoior for signs of bleeding on Eliquis.    Diagnosis: HF (heart failure)  Assessment and Plan of Treatment: You had an acute exacerbation of your heart failure  - Continue to monitor your weights daily  - Maintain a low sodium diet and a fluid restriction of 1500mL per day  - Continue to take Lasix 40mg daily  - Follow up with your cardiologist Dr. Campbell    Diagnosis: Acute pulmonary embolism  Assessment and Plan of Treatment: You were found to have acute pulmonary embolisms and an acute left lower extremity DVT (deep vein thrombosis)  - Continue Eliquis 5mg twice a day    Diagnosis: PNA (pneumonia)  Assessment and Plan of Treatment: - You completed your course of IV antibiotics

## 2020-06-24 NOTE — DIETITIAN INITIAL EVALUATION ADULT. - PERTINENT MEDS FT
MEDICATIONS  (STANDING):  acetaminophen   Tablet .. 500 milliGRAM(s) Oral daily  apixaban 10 milliGRAM(s) Oral every 12 hours  atorvastatin 10 milliGRAM(s) Oral at bedtime  furosemide    Tablet 40 milliGRAM(s) Oral daily  metoprolol tartrate 50 milliGRAM(s) Oral two times a day  senna 2 Tablet(s) Oral at bedtime    MEDICATIONS  (PRN):  acetaminophen   Tablet .. 650 milliGRAM(s) Oral every 6 hours PRN Temp greater or equal to 38C (100.4F), Mild Pain (1 - 3)  morphine  - Injectable 1 milliGRAM(s) IV Push every 3 hours PRN dyspnea

## 2020-06-24 NOTE — DIETITIAN INITIAL EVALUATION ADULT. - PERTINENT LABORATORY DATA
06-24 Na136 mmol/L Glu 97 mg/dL K+ 4.4 mmol/L Cr  1.09 mg/dL BUN 38 mg/dL<H> Phos n/a   Alb n/a   PAB n/a

## 2020-06-24 NOTE — DISCHARGE NOTE PROVIDER - HOSPITAL COURSE
CHIEF COMPLAINT/DIAGNOSIS: SOB        HPI: 89y female w/ pmh CAD s/p PCI x1, b/l hearing loss, pancreatic mass, HLD, HTN, OA, and spinal stenosis brought in from [facility] due to SOB, and new onset afib. Pt found to be hypoxic at 84% on RA. As per pt's daughter, pt's mental status has declined since a TIA in 2017, especially since the COVID pandemic as she has been largely isolated in her room; used to ambulate with walker but not as of recently. patient admitted to  and is being treated for Acute respiratory failure w/ hypoxia due to acute PE, CHF exacerbation, PNA, new onset afib and severe pulmonary htn.          SUBJECTIVE:    6/24 -         REVIEW OF SYSTEMS:    All other review of systems is negative unless indicated above        VITAL SIGNS: ALL REVIEWED         PHYSICAL EXAM:    Gen - chronic ill appearing elderly female, seated in chair; NAD    Neuro: AAOx2; fluent speech, following commands, answering questions appropriately,     HEENT- PERRL, moist mucus membranes, OP clear    CV - IRIR, +murmur, No r/g    Lungs - Good effort, Clear to auscultation bilaterally    back- kyphotic    Abdomen - Soft, nontender, nondistended, +BS, No rebound/rigidity/guarding    Ext - No cyanosis/clubbing.  mild b/l edema    Skin - No rashes, No jaundice        LABS: ALL REVIEWED         MEDICATIONS: ALL REVIEWED         ASSESSMENT AND PLAN:        1. Acute hypoxic respiratory failure due to acute PE, HCAP (GNR),  mod b/l effusions/ acute systolic CHF exacerbation    - tele monitoring. tele review as above. afib 90s    - cont. supplemental 02 -- currently using 3/4L--> titrate down    - COVID PCR negative, COVID19 IgG Antibody positive    - S/P course of IV Ceftriaxone / Azithromycin     - Morphine PRN for dyspnea     - eliquis for PE     - IV lasix for effusions/chf, strict I/O, daily wts  .  CT chest 6/18/20, repeat CXR in AM -- switched to PO lasix 40mg daily d/w cardio        2.  Acute LLE DVT/ chronic LLE DVT     - Cont. Eliquis        3. New Onset Afib    - Afib rate controlled    - CHADSVASC: 5, started on Eliquis 10 mg bid (getting loading dose for acute PE/DVT) -- (last dose of 10mg tonight 6/24)    - TSH wnl    - s/p cardizem gtt. -- d/c Cardizem PO d/w cardio and Cont. metoprolol (changed to tartrate 50mg BID)    - cardio f/u appreciated         4. Acute on chronic systolic CHF    - Echo 6/20- EF 30-35%, mod to severe MR, mod TR, severe pulm htn , small to mod pericardial effusion     - Cont. IV Lasix BID - switched to PO    - Daily weight / low sodium diet / FR 1500ml      - continue BB.  Add ACEi when BP can tolerate     - cardio f/u appreciated          5. Elevated troponin    - Likely due to demand ischemia- chf, afib, PE    - Cont. statin        6. Elevated creatinine - resolved    - Did not meet criteria for UMBERTO    - Monitor renal function closely while on Lasix, avoid nephrotoxic agents        7. OA, chronic pain    - Cont. Tylenol        8. DVT PPX    - PO Eliquis         ADVANCE DIRECTIVES/ CODE STATUS: DNR/DNI - MOLST on chart. See [facility] note.        6/23 - LM for daughter Kalani [phone] CHIEF COMPLAINT/DIAGNOSIS: SOB        HPI: 89y female w/ pmh CAD s/p PCI x1, b/l hearing loss, pancreatic mass, HLD, HTN, OA, and spinal stenosis brought in from [facility] due to SOB, and new onset afib. Pt found to be hypoxic at 84% on RA. As per pt's daughter, pt's mental status has declined since a TIA in 2017, especially since the COVID pandemic as she has been largely isolated in her room; used to ambulate with walker but not as of recently. patient admitted to  and is being treated for Acute respiratory failure w/ hypoxia due to acute PE, CHF exacerbation, PNA, new onset afib and severe pulmonary htn.          SUBJECTIVE:            REVIEW OF SYSTEMS:    All other review of systems is negative unless indicated above        VITAL SIGNS: ALL REVIEWED         PHYSICAL EXAM:    Gen - chronic ill appearing elderly female, seated in chair; NAD    Neuro: AAOx2; fluent speech, following commands, answering questions appropriately,     HEENT- PERRL, moist mucus membranes, OP clear    CV - IRIR, +murmur, No r/g    Lungs - Good effort, Clear to auscultation bilaterally    back- kyphotic    Abdomen - Soft, nontender, nondistended, +BS, No rebound/rigidity/guarding    Ext - No cyanosis/clubbing.  mild b/l edema    Skin - No rashes, No jaundice        LABS: ALL REVIEWED         MEDICATIONS: ALL REVIEWED         ASSESSMENT AND PLAN: CHIEF COMPLAINT/DIAGNOSIS: SOB        HPI: 89y female w/ pmh CAD s/p PCI x1, b/l hearing loss, pancreatic mass, HLD, HTN, OA, and spinal stenosis brought in from [facility] due to SOB, and new onset afib. Pt found to be hypoxic at 84% on RA. As per pt's daughter, pt's mental status has declined since a TIA in 2017, especially since the COVID pandemic as she has been largely isolated in her room; used to ambulate with walker but not as of recently. patient admitted to  and is being treated for Acute respiratory failure w/ hypoxia due to acute PE, CHF exacerbation, PNA, new onset afib and severe pulmonary htn.          SUBJECTIVE:    6/26 - still with itching, not c/o of sob, no acute distress         REVIEW OF SYSTEMS:    All other review of systems is negative unless indicated above        VITAL SIGNS: ALL REVIEWED         PHYSICAL EXAM:    Gen - chronic ill appearing elderly female, seated in chair; NAD    Neuro: AAOx2; fluent speech, following commands, answering questions appropriately,     HEENT- PERRL, moist mucus membranes, OP clear    CV - IRIR, +murmur, No r/g    Lungs - Good effort, Clear to auscultation bilaterally    back- kyphotic    Abdomen - Soft, nontender, nondistended, +BS, No rebound/rigidity/guarding    Ext - No cyanosis/clubbing.  mild b/l edema    Skin - No rashes, No jaundice        LABS: ALL REVIEWED         MEDICATIONS: ALL REVIEWED         ASSESSMENT AND PLAN:        1. Acute hypoxic respiratory failure due to acute PE, HCAP (GNR),  mod b/l effusions/ acute systolic CHF exacerbation    - tele monitoring. tele review as above. afib 90s    - cont. supplemental 02 -- currently using 3/4L--> titrate down    - COVID PCR negative, COVID19 IgG Antibody positive    - completed iv antibiotics     - Morphine PRN for dyspnea    - eliquis for PE     - IV lasix for effusions/ chf, Strict I/O, daily wts  .  CT chest 6/18/20, repeat CXR in AM -- switched to PO lasix 40mg daily d/w cardio    6/24 - repeat cxr w/ progression of small pleural effusions, will given extra 40mg Iv lasix tonight    6/25- didn't receive iv lasix yesterday evening.   change to IV Lasix 20mg BID        2.  Acute LLE DVT/ chronic LLE DVT     - Cont. Eliquis    - monitor H&H        3. New Onset Afib    - Afib rate controlled    - CHADSVASC: 5, started on Eliquis 10 mg bid (getting loading dose for acute PE/DVT) -- will start 5mg 6/25    - TSH wnl    - s/p cardizem gtt. -- d/c Cardizem PO d/w cardio    - continue home dose toprol    - cardio f/u appreciated         4. Acute on chronic systolic CHF, pleural effusions    as above-  continue iv Lasix     - Echo 6/20- EF 30-35%, mod to severe MR, mod TR, severe pulm htn , small to mod pericardial effusion     - Daily weight / low sodium diet / FR 1500ml      - continue BB.  Add ACEi when BP can tolerate     - cardio f/u appreciated          5. GI Bleed    - FOBT positive, H&H dropping    - cont. to monitor on eliquis    - d/w daughter - will defer GI at this time. may opt for a total comfort approach.     - palliative cx    6/26 - repeat H&H at 1pm, consider d/c Eliquis if H&H cont. to drop        6. Elevated troponin    - Likely due to demand ischemia- chf, afib, PE    - Cont. statin        7. Elevated creatinine - resolved    - Did not meet criteria for UMBERTO    - Monitor renal function closely while on Lasix, avoid nephrotoxic agents        8. OA, chronic pain    - Cont. Tylenol        9. DVT PPX    - PO Eliquis         ADVANCE DIRECTIVES/ CODE STATUS: DNR/DNI - MOLST on chart. See [facility] note. ASSESSMENT AND PLAN:        1. Acute hypoxic respiratory failure due to acute PE, HCAP (GNR),  mod b/l effusions/ acute systolic CHF exacerbation    - tele monitoring. tele review as above. afib 90s    - cont. supplemental 02 -- currently using 3/4L--> titrate down    - COVID PCR negative, COVID19 IgG Antibody positive    - completed iv antibiotics     - Morphine PRN for dyspnea    - eliquis for PE     - IV lasix for effusions/ chf, Strict I/O, daily wts  .  CT chest 6/18/20, repeat CXR in AM -- switched to PO lasix 40mg daily d/w cardio    6/24 - repeat cxr w/ progression of small pleural effusions, will given extra 40mg Iv lasix tonight    6/25- didn't receive iv lasix yesterday evening.   change to IV Lasix 20mg BID    6/26- change to po lasix for d/c         2.  Acute LLE DVT/ chronic LLE DVT     - Cont. Eliquis    - monitor H&H        3. New Onset Afib    - Afib rate controlled    - CHADSVASC: 5, started on Eliquis 10 mg bid (getting loading dose for acute PE/DVT) -- will start 5mg 6/25    - TSH wnl    - s/p cardizem gtt. -- d/c Cardizem PO d/w cardio    - continue home dose toprol    - cardio f/u appreciated         4. Acute on chronic systolic CHF, pleural effusions    as above-  continue iv Lasix     - Echo 6/20- EF 30-35%, mod to severe MR, mod TR, severe pulm htn , small to mod pericardial effusion     - Daily weight / low sodium diet / FR 1500ml      - continue BB.  Add ACEi when BP can tolerate     - cardio f/u appreciated          5. GI Bleed    - FOBT positive, H&H dropping    - cont. to monitor on eliquis    - d/w daughter - will defer GI at this time. may opt for a total comfort approach.     - palliative cx    6/26 - repeat H&H at 1pm, will stop  Eliquis if H&H cont. to drop        6. Elevated troponin    - Likely due to demand ischemia- chf, afib, PE    - Cont. statin        7. Elevated creatinine - resolved    - Did not meet criteria for UMBERTO    - Monitor renal function closely while on Lasix, avoid nephrotoxic agents        8. OA, chronic pain    - Cont. Tylenol        9. DVT PPX    - PO Eliquis         ADVANCE DIRECTIVES/ CODE STATUS: DNR/DNI - MOLST on chart. See [facility] note.    Palliative Care f/u appreciated.   Morphine SL for pain. Supplemental O2.                  Attending Attestation:     Pt seen and examined- reviewed and agree w/ NP Johanna as above.    repeat h/h- if drop, will d/c eliquis.    continue lasix- change to po for d/c.  Supplemental O2.  Morphine SL.     Family meeting today- may want d/c today , comfort measures.          Plan discussed with team.

## 2020-06-24 NOTE — DIETITIAN INITIAL EVALUATION ADULT. - OTHER INFO
89 y/o F PMHx significant for ASHD s/p PCI x1, b/l hearing loss, pancreatic mass, HLD, HTN, OA, and spinal stenosis brought in from [facility] due to SOB, and new onset afib. Pt found to be hypoxic at 84% on RA and place on supp O2. Unable to obtain proper history from pt due to confusion. Pt opens eyes to commands and denies pain. As per pt's daughter, pt's mental status has declined since a TIA in 2017, especially since the COVID pandemic as she has been largely isolated in her room; used to ambulate with walker but not as of recently. Recently got a 24 hour aide this week. Saw pt 2 weeks ago and noticed the increased b/l LE swelling. There has been no official diagnosis of dementia made but has been discussed with pt's PCP. Daughter is not aware if pt had fever or cough prior to ED arrival.     Pt seen for assessment. Pt noted as confused in chart noted with possible dementia (no official d/x?). Pt seen eating light breakfast (fruit and juice). Pt slow to answer but states appetite is good with no issues. Pt appears to be able to chew and swallow. no noted n/v/d/c. Pt is noted with 2+ edema in legs. Pt is skinny but also 89yo. difficult to determine if pt's muscle wasting is associated with advanced age or under nutrition. Pt diet order for 1500 FR and Ensure TID. Overall pt's nutiriotnal status in question, likely not meeting needs 2/2 advance age and cognitive decline. Will continue to monitor pt's nutritional status

## 2020-06-25 LAB
ANION GAP SERPL CALC-SCNC: 3 MMOL/L — LOW (ref 5–17)
BUN SERPL-MCNC: 41 MG/DL — HIGH (ref 7–23)
CALCIUM SERPL-MCNC: 9.7 MG/DL — SIGNIFICANT CHANGE UP (ref 8.5–10.1)
CHLORIDE SERPL-SCNC: 99 MMOL/L — SIGNIFICANT CHANGE UP (ref 96–108)
CO2 SERPL-SCNC: 34 MMOL/L — HIGH (ref 22–31)
CREAT SERPL-MCNC: 1.04 MG/DL — SIGNIFICANT CHANGE UP (ref 0.5–1.3)
GLUCOSE SERPL-MCNC: 101 MG/DL — HIGH (ref 70–99)
HCT VFR BLD CALC: 38.1 % — SIGNIFICANT CHANGE UP (ref 34.5–45)
HGB BLD-MCNC: 11.6 G/DL — SIGNIFICANT CHANGE UP (ref 11.5–15.5)
MCHC RBC-ENTMCNC: 28.1 PG — SIGNIFICANT CHANGE UP (ref 27–34)
MCHC RBC-ENTMCNC: 30.4 GM/DL — LOW (ref 32–36)
MCV RBC AUTO: 92.3 FL — SIGNIFICANT CHANGE UP (ref 80–100)
OB PNL STL: POSITIVE
PLATELET # BLD AUTO: 171 K/UL — SIGNIFICANT CHANGE UP (ref 150–400)
POTASSIUM SERPL-MCNC: 3.7 MMOL/L — SIGNIFICANT CHANGE UP (ref 3.5–5.3)
POTASSIUM SERPL-SCNC: 3.7 MMOL/L — SIGNIFICANT CHANGE UP (ref 3.5–5.3)
RBC # BLD: 4.13 M/UL — SIGNIFICANT CHANGE UP (ref 3.8–5.2)
RBC # FLD: 15.8 % — HIGH (ref 10.3–14.5)
SODIUM SERPL-SCNC: 136 MMOL/L — SIGNIFICANT CHANGE UP (ref 135–145)
WBC # BLD: 4.85 K/UL — SIGNIFICANT CHANGE UP (ref 3.8–10.5)
WBC # FLD AUTO: 4.85 K/UL — SIGNIFICANT CHANGE UP (ref 3.8–10.5)

## 2020-06-25 PROCEDURE — 99233 SBSQ HOSP IP/OBS HIGH 50: CPT

## 2020-06-25 RX ORDER — METOPROLOL TARTRATE 50 MG
50 TABLET ORAL DAILY
Refills: 0 | Status: DISCONTINUED | OUTPATIENT
Start: 2020-06-25 | End: 2020-06-26

## 2020-06-25 RX ORDER — FUROSEMIDE 40 MG
20 TABLET ORAL
Refills: 0 | Status: DISCONTINUED | OUTPATIENT
Start: 2020-06-25 | End: 2020-06-26

## 2020-06-25 RX ADMIN — APIXABAN 5 MILLIGRAM(S): 2.5 TABLET, FILM COATED ORAL at 21:49

## 2020-06-25 RX ADMIN — APIXABAN 5 MILLIGRAM(S): 2.5 TABLET, FILM COATED ORAL at 10:07

## 2020-06-25 RX ADMIN — Medication 500 MILLIGRAM(S): at 11:21

## 2020-06-25 RX ADMIN — Medication 20 MILLIGRAM(S): at 10:07

## 2020-06-25 RX ADMIN — ATORVASTATIN CALCIUM 10 MILLIGRAM(S): 80 TABLET, FILM COATED ORAL at 21:49

## 2020-06-25 RX ADMIN — SENNA PLUS 2 TABLET(S): 8.6 TABLET ORAL at 21:49

## 2020-06-25 RX ADMIN — MORPHINE SULFATE 1 MILLIGRAM(S): 50 CAPSULE, EXTENDED RELEASE ORAL at 11:18

## 2020-06-25 RX ADMIN — Medication 20 MILLIGRAM(S): at 17:21

## 2020-06-25 RX ADMIN — Medication 50 MILLIGRAM(S): at 10:10

## 2020-06-25 NOTE — PROGRESS NOTE ADULT - SUBJECTIVE AND OBJECTIVE BOX
CHIEF COMPLAINT/DIAGNOSIS: SOB    HPI: 89y female w/ pmh CAD s/p PCI x1, b/l hearing loss, pancreatic mass, HLD, HTN, OA, and [facility], and new onset afib. Pt found to be hypoxic at 84% on RA. As per pt's daughter, pt's mental status has declined since a TIA in 2017, especially since the COVID pandemic as she has been largely isolated in her room; used to ambulate with walker but not as of recently. patient admitted to  and is being treated for Acute respiratory failure w/ hypoxia due to acute PE, CHF exacerbation, PNA, new onset afib and severe pulmonary htn.      SUBJECTIVE:  6/23 - still w/ + body pain, visibly less SOB, no acute distress. denies cp   6/24 - no distress, upright in chair, edema present, still + sob  6/24 - + back itching, + body weakness, + sob    REVIEW OF SYSTEMS:  All other review of systems is negative unless indicated above    Vital Signs Last 24 Hrs  T(C): 36.6 (25 Jun 2020 10:10), Max: 36.8 (24 Jun 2020 22:00)  T(F): 97.8 (25 Jun 2020 10:10), Max: 98.3 (24 Jun 2020 22:00)  HR: 96 (25 Jun 2020 10:10) (82 - 98)  BP: 110/62 (25 Jun 2020 10:10) (91/52 - 110/62)  BP(mean): --  RR: 18 (25 Jun 2020 10:10) (18 - 18)  SpO2: 96% (24 Jun 2020 22:00) (96% - 96%) -- 3L NC    PHYSICAL EXAM:  Gen - chronic ill appearing elderly female, seated in chair; NAD  Neuro: AAOx2; fluent speech, following commands, answering questions appropriately,   HEENT- PERRL, moist mucus membranes, OP clear  CV - IRIR, +murmur, No r/g  Lungs - Good effort, Clear to auscultation bilaterally  back- kyphotic  Abdomen - Soft, nontender, nondistended, +BS, No rebound/rigidity/guarding  Ext - No cyanosis/clubbing.  mild b/l edema  Skin - No rashes, No jaundice      LABS: All Labs Reviewed:                        11.6   4.85  )-----------( 171      ( 25 Jun 2020 09:39 )             38.1     06-25    136  |  99  |  41<H>  ----------------------------<  101<H>  3.7   |  34<H>  |  1.04    Ca    9.7      25 Jun 2020 09:39      MEDICATIONS  (STANDING):  acetaminophen   Tablet .. 500 milliGRAM(s) Oral daily  apixaban 10 milliGRAM(s) Oral every 12 hours  atorvastatin 10 milliGRAM(s) Oral at bedtime  furosemide   Injectable 40 milliGRAM(s) IV Push two times a day  metoprolol tartrate 50 milliGRAM(s) Oral two times a day  senna 2 Tablet(s) Oral at bedtime    MEDICATIONS  (PRN):  acetaminophen   Tablet .. 650 milliGRAM(s) Oral every 6 hours PRN Temp greater or equal to 38C (100.4F), Mild Pain (1 - 3)  morphine  - Injectable 1 milliGRAM(s) IV Push every 3 hours PRN dyspnea    TELEMETRY REVIEW:  6/22: afib 90s  6/23: afib 80-90s  6/24 - afib 120s briefly   6/25 - afib 100-110s      ASSESSMENT AND PLAN:    1. Acute hypoxic respiratory failure due to acute PE, HCAP (GNR),  mod b/l effusions/ acute systolic CHF exacerbation  - tele monitoring. tele review as above. afib 90s  - cont. supplemental 02 -- currently using 3/4L--> titrate down  - COVID PCR negative, COVID19 IgG Antibody positive  - cont. Ceftriaxone / Azithromycin completed  - Morphine PRN for dyspnea  - eliquis for PE   - IV lasix for effusions/ chf, Strict I/O, daily wts  .  CT chest 6/18/20, repeat CXR in AM -- switched to PO lasix 40mg daily d/w cardio  6/24 - repeat cxr w/ progression of small pleural effusions, will given extra 40mg Iv lasix tonight and resume IV Lasix 20mg BID    2.  Acute LLE DVT/ chronic LLE DVT   - Cont. Eliquis    3. New Onset Afib  - Afib rate controlled  - CHADSVASC: 5, started on Eliquis 10 mg bid (getting loading dose for acute PE/DVT) -- will start 5mg   - TSH wnl  - s/p cardizem gtt. -- d/c Cardizem PO d/w cardio and Cont. metoprolol (changed to tartrate 50mg BID)  - cardio f/u appreciated     4. Acute on chronic systolic CHF, pleural effusions  as above-  continue iv Lasix   - Echo 6/20- EF 30-35%, mod to severe MR, mod TR, severe pulm htn , small to mod pericardial effusion   - Daily weight / low sodium diet / FR 1500ml    - continue BB.  Add ACEi when BP can tolerate   - cardio f/u appreciated      5. GI Bleed  - FOBT positive, H&Hstable   - cont. to monitor on eliquis  - d/w daughter - will defer GI at this time. may opt for a total comfort approach.    6. Elevated troponin  - Likely due to demand ischemia- chf, afib, PE  - Cont. statin    7. Elevated creatinine - resolved  - Did not meet criteria for UMBERTO  - Monitor renal function closely while on Lasix, avoid nephrotoxic agents    8. OA, chronic pain  - Cont. Tylenol    9. DVT PPX  - PO Eliquis     ADVANCE DIRECTIVES/ CODE STATUS: DNR/DNI - MOLST on chart. See [facility] note.    6/25 - spoke w/ daughter dipika again. fully aware of worsening HF/lung congestion with need to switch back to IV lasix. not interested in any aggressive measures. May opt for total comfort approach in light of progression of HF and new found GIB however, would like to inform her brother and daughter (etelvina) of new findings and options moving forward. CHIEF COMPLAINT/DIAGNOSIS: SOB    HPI: 89y female w/ pmh CAD s/p PCI x1, b/l hearing loss, pancreatic mass, HLD, HTN, OA, and spinal stenosis brought in from [facility] due to SOB, and new onset afib. Pt found to be hypoxic at 84% on RA. As per pt's daughter, pt's mental status has declined since a TIA in 2017, especially since the COVID pandemic as she has been largely isolated in her room; used to ambulate with walker but not as of recently. patient admitted to  and is being treated for Acute respiratory failure w/ hypoxia due to acute PE, CHF exacerbation, PNA, new onset afib and severe pulmonary htn.      SUBJECTIVE:  6/23 - still w/ + body pain, visibly less SOB, no acute distress. denies cp   6/24 - no distress, upright in chair, edema present, still + sob  6/24 - + back itching, + body weakness, + sob    REVIEW OF SYSTEMS:  All other review of systems is negative unless indicated above    Vital Signs Last 24 Hrs  T(C): 36.6 (25 Jun 2020 10:10), Max: 36.8 (24 Jun 2020 22:00)  T(F): 97.8 (25 Jun 2020 10:10), Max: 98.3 (24 Jun 2020 22:00)  HR: 96 (25 Jun 2020 10:10) (82 - 98)  BP: 110/62 (25 Jun 2020 10:10) (91/52 - 110/62)  BP(mean): --  RR: 18 (25 Jun 2020 10:10) (18 - 18)  SpO2: 96% (24 Jun 2020 22:00) (96% - 96%) -- 3L NC    PHYSICAL EXAM:  Gen - chronic ill appearing elderly female, seated in chair; NAD  Neuro: AAOx2; fluent speech, following commands, answering questions appropriately,   HEENT- PERRL, moist mucus membranes, OP clear  CV - IRIR, +murmur, No r/g  Lungs - Good effort, Clear to auscultation bilaterally  back- kyphotic  Abdomen - Soft, nontender, nondistended, +BS, No rebound/rigidity/guarding  Ext - No cyanosis/clubbing.  mild b/l edema  Skin - No rashes, No jaundice      LABS: All Labs Reviewed:                        11.6   4.85  )-----------( 171      ( 25 Jun 2020 09:39 )             38.1     06-25    136  |  99  |  41<H>  ----------------------------<  101<H>  3.7   |  34<H>  |  1.04    Ca    9.7      25 Jun 2020 09:39      MEDICATIONS  (STANDING):  acetaminophen   Tablet .. 500 milliGRAM(s) Oral daily  apixaban 5 milliGRAM(s) Oral every 12 hours  atorvastatin 10 milliGRAM(s) Oral at bedtime  furosemide   Injectable 20 milliGRAM(s) IV Push two times a day  metoprolol succinate ER 50 milliGRAM(s) Oral daily  senna 2 Tablet(s) Oral at bedtime    MEDICATIONS  (PRN):  acetaminophen   Tablet .. 650 milliGRAM(s) Oral every 6 hours PRN Temp greater or equal to 38C (100.4F), Mild Pain (1 - 3)  morphine  - Injectable 1 milliGRAM(s) IV Push every 3 hours PRN dyspnea    TELEMETRY REVIEW:  6/22: afib 90s  6/23: afib 80-90s  6/24 - afib 120s briefly   6/25 - afib 100-110s      ASSESSMENT AND PLAN:    1. Acute hypoxic respiratory failure due to acute PE, HCAP (GNR),  mod b/l effusions/ acute systolic CHF exacerbation  - tele monitoring. tele review as above. afib 90s  - cont. supplemental 02 -- currently using 3/4L--> titrate down  - COVID PCR negative, COVID19 IgG Antibody positive  - completed iv antibiotics   - Morphine PRN for dyspnea  - eliquis for PE   - IV lasix for effusions/ chf, Strict I/O, daily wts  .  CT chest 6/18/20, repeat CXR in AM -- switched to PO lasix 40mg daily d/w cardio  6/24 - repeat cxr w/ progression of small pleural effusions, will given extra 40mg Iv lasix tonight  6/25- didn't receive iv lasix yesterday evening.   change to IV Lasix 20mg BID    2.  Acute LLE DVT/ chronic LLE DVT   - Cont. Eliquis    3. New Onset Afib  - Afib rate controlled  - CHADSVASC: 5, started on Eliquis 10 mg bid (getting loading dose for acute PE/DVT) -- will start 5mg 6/25  - TSH wnl  - s/p cardizem gtt. -- d/c Cardizem PO d/w cardio  - continue home dose toprol  - cardio f/u appreciated     4. Acute on chronic systolic CHF, pleural effusions  as above-  continue iv Lasix   - Echo 6/20- EF 30-35%, mod to severe MR, mod TR, severe pulm htn , small to mod pericardial effusion   - Daily weight / low sodium diet / FR 1500ml    - continue BB.  Add ACEi when BP can tolerate   - cardio f/u appreciated      5. GI Bleed  - FOBT positive, H&Hstable   - cont. to monitor on eliquis  - d/w daughter - will defer GI at this time. may opt for a total comfort approach.    6. Elevated troponin  - Likely due to demand ischemia- chf, afib, PE  - Cont. statin    7. Elevated creatinine - resolved  - Did not meet criteria for UMBERTO  - Monitor renal function closely while on Lasix, avoid nephrotoxic agents    8. OA, chronic pain  - Cont. Tylenol    9. DVT PPX  - PO Eliquis     ADVANCE DIRECTIVES/ CODE STATUS: DNR/DNI - MOLST on chart. See [facility] note.    6/25 - spoke w/ daughter dipika again. fully aware of worsening CHF/lung congestion with need to switch back to IV lasix. not interested in any aggressive measures. May opt for total comfort approach in light of progression of CHF and new found GIB however, would like to inform her brother and daughter (etelvina) of new findings and options moving forward.

## 2020-06-26 ENCOUNTER — TRANSCRIPTION ENCOUNTER (OUTPATIENT)
Age: 85
End: 2020-06-26

## 2020-06-26 VITALS
OXYGEN SATURATION: 99 % | TEMPERATURE: 97 F | DIASTOLIC BLOOD PRESSURE: 75 MMHG | RESPIRATION RATE: 18 BRPM | SYSTOLIC BLOOD PRESSURE: 102 MMHG | HEART RATE: 115 BPM

## 2020-06-26 LAB
ANION GAP SERPL CALC-SCNC: 2 MMOL/L — LOW (ref 5–17)
BUN SERPL-MCNC: 42 MG/DL — HIGH (ref 7–23)
CALCIUM SERPL-MCNC: 9.2 MG/DL — SIGNIFICANT CHANGE UP (ref 8.5–10.1)
CHLORIDE SERPL-SCNC: 99 MMOL/L — SIGNIFICANT CHANGE UP (ref 96–108)
CO2 SERPL-SCNC: 35 MMOL/L — HIGH (ref 22–31)
CREAT SERPL-MCNC: 0.97 MG/DL — SIGNIFICANT CHANGE UP (ref 0.5–1.3)
GLUCOSE SERPL-MCNC: 95 MG/DL — SIGNIFICANT CHANGE UP (ref 70–99)
HCT VFR BLD CALC: 33.1 % — LOW (ref 34.5–45)
HCT VFR BLD CALC: 34.1 % — LOW (ref 34.5–45)
HGB BLD-MCNC: 10 G/DL — LOW (ref 11.5–15.5)
HGB BLD-MCNC: 10.4 G/DL — LOW (ref 11.5–15.5)
MCHC RBC-ENTMCNC: 28.1 PG — SIGNIFICANT CHANGE UP (ref 27–34)
MCHC RBC-ENTMCNC: 28.4 PG — SIGNIFICANT CHANGE UP (ref 27–34)
MCHC RBC-ENTMCNC: 30.2 GM/DL — LOW (ref 32–36)
MCHC RBC-ENTMCNC: 30.5 GM/DL — LOW (ref 32–36)
MCV RBC AUTO: 93 FL — SIGNIFICANT CHANGE UP (ref 80–100)
MCV RBC AUTO: 93.2 FL — SIGNIFICANT CHANGE UP (ref 80–100)
PLATELET # BLD AUTO: 148 K/UL — LOW (ref 150–400)
PLATELET # BLD AUTO: 171 K/UL — SIGNIFICANT CHANGE UP (ref 150–400)
POTASSIUM SERPL-MCNC: 3.8 MMOL/L — SIGNIFICANT CHANGE UP (ref 3.5–5.3)
POTASSIUM SERPL-SCNC: 3.8 MMOL/L — SIGNIFICANT CHANGE UP (ref 3.5–5.3)
RBC # BLD: 3.56 M/UL — LOW (ref 3.8–5.2)
RBC # BLD: 3.66 M/UL — LOW (ref 3.8–5.2)
RBC # FLD: 16 % — HIGH (ref 10.3–14.5)
RBC # FLD: 16 % — HIGH (ref 10.3–14.5)
SARS-COV-2 RNA SPEC QL NAA+PROBE: SIGNIFICANT CHANGE UP
SODIUM SERPL-SCNC: 136 MMOL/L — SIGNIFICANT CHANGE UP (ref 135–145)
WBC # BLD: 4.25 K/UL — SIGNIFICANT CHANGE UP (ref 3.8–10.5)
WBC # BLD: 5.01 K/UL — SIGNIFICANT CHANGE UP (ref 3.8–10.5)
WBC # FLD AUTO: 4.25 K/UL — SIGNIFICANT CHANGE UP (ref 3.8–10.5)
WBC # FLD AUTO: 5.01 K/UL — SIGNIFICANT CHANGE UP (ref 3.8–10.5)

## 2020-06-26 PROCEDURE — 99239 HOSP IP/OBS DSCHRG MGMT >30: CPT

## 2020-06-26 PROCEDURE — 99233 SBSQ HOSP IP/OBS HIGH 50: CPT

## 2020-06-26 RX ORDER — PANTOPRAZOLE SODIUM 20 MG/1
1 TABLET, DELAYED RELEASE ORAL
Qty: 0 | Refills: 0 | DISCHARGE
Start: 2020-06-26

## 2020-06-26 RX ORDER — HYDROCORTISONE 1 %
1 OINTMENT (GRAM) TOPICAL
Refills: 0 | Status: DISCONTINUED | OUTPATIENT
Start: 2020-06-26 | End: 2020-06-26

## 2020-06-26 RX ORDER — HYDROCORTISONE 1 %
1 OINTMENT (GRAM) TOPICAL
Qty: 0 | Refills: 0 | DISCHARGE
Start: 2020-06-26

## 2020-06-26 RX ORDER — PANTOPRAZOLE SODIUM 20 MG/1
40 TABLET, DELAYED RELEASE ORAL
Refills: 0 | Status: DISCONTINUED | OUTPATIENT
Start: 2020-06-26 | End: 2020-06-26

## 2020-06-26 RX ORDER — FUROSEMIDE 40 MG
2 TABLET ORAL
Qty: 0 | Refills: 0 | DISCHARGE

## 2020-06-26 RX ORDER — METOPROLOL TARTRATE 50 MG
1 TABLET ORAL
Qty: 0 | Refills: 0 | DISCHARGE
Start: 2020-06-26

## 2020-06-26 RX ORDER — METOPROLOL TARTRATE 50 MG
1 TABLET ORAL
Qty: 0 | Refills: 0 | DISCHARGE

## 2020-06-26 RX ORDER — MORPHINE SULFATE 50 MG/1
2.5 CAPSULE, EXTENDED RELEASE ORAL EVERY 4 HOURS
Refills: 0 | Status: DISCONTINUED | OUTPATIENT
Start: 2020-06-26 | End: 2020-06-26

## 2020-06-26 RX ORDER — FUROSEMIDE 40 MG
1 TABLET ORAL
Qty: 0 | Refills: 0 | DISCHARGE

## 2020-06-26 RX ORDER — MORPHINE SULFATE 50 MG/1
0.13 CAPSULE, EXTENDED RELEASE ORAL
Qty: 0 | Refills: 0 | DISCHARGE
Start: 2020-06-26

## 2020-06-26 RX ADMIN — APIXABAN 5 MILLIGRAM(S): 2.5 TABLET, FILM COATED ORAL at 10:21

## 2020-06-26 RX ADMIN — Medication 1 APPLICATION(S): at 16:49

## 2020-06-26 RX ADMIN — Medication 50 MILLIGRAM(S): at 10:21

## 2020-06-26 RX ADMIN — Medication 20 MILLIGRAM(S): at 10:21

## 2020-06-26 RX ADMIN — Medication 500 MILLIGRAM(S): at 14:11

## 2020-06-26 NOTE — DISCHARGE NOTE NURSING/CASE MANAGEMENT/SOCIAL WORK - NSDCPEFALRISK_GEN_ALL_CORE
Called patient, Markell Schultz Patient information on fall and injury prevention

## 2020-06-26 NOTE — GOALS OF CARE CONVERSATION - ADVANCED CARE PLANNING - NS PRO AD PATIENT TYPE
Do Not Resuscitate (DNR)/Medical Orders for Life-Sustaining Treatment (MOLST)/Health Care Proxy (HCP)
Medical Orders for Life-Sustaining Treatment (MOLST)/Health Care Proxy (HCP)

## 2020-06-26 NOTE — GOALS OF CARE CONVERSATION - ADVANCED CARE PLANNING - CONVERSATION DETAILS
Spoke with pts HCP Talisha via phone to discuss goals of care.  Reviewed the role of palliative medicine, pts condition PTA and active medical issues.    Laura explains that for the last 6 months pts has declined.  she shares that pt has dx of dementia and this seems to have progressed, especially in relation to the isolation during COVID quarantine.  she explains about a week PTA they had hired an aide to help pt with ADLs because she could no longer perform them herself.  she also notes that she developed swelling in her legs that also contributed to pts difficulty walking to the point where Laura couldn't even get her to stand.  This is when she was brought in with SOB.      We reviewed all medical issues since admission including hypoxia related to CHF with effusions, new onset afib, pneumonia, and PE, DVT.  We reviewed the treatment for all of these issues and the most recent complication of GI bleed, for which Eliquis may need to be held.  Laura was clear she wouldn't want anything invasive, such as thoracentesis or GI workup for bleed.  She explains the goal for pt is comfort.    We discussed dispo options and criteria for hospice eligibility.  Reviewed criteria for inpt hospice, which currently pt does not meet, but may meet in the near future.  Laura has also been coordinating with [facility] regarding other dispo options and pt has been accepted at a facility in [facility] that has a palliative care program.  Explained to Laura that pt could not receive hospice at the facility until she has transitioned to LTC.  Laura expressed understanding of all of this and was comfortable with the plan to proceed with discharge to [facility].    I reassured her that we had added Morphine to help with her symptom burden and that regardless of how pt does clinically her comfort would be paramount.  She expressed thanks.  Emotional support provided.
Discussed with daughter Kalani over phone [phone]. 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 Per family pt's health started declining from January to May and they were suspecting COVID infection during the pandemic. Patient now with new cardiomyopathy (EF 30-35%), CHF exac, new A-fib, acute PE/DVT now on Eliquis. Family (daughters) aware of diagnosis and currently poor performance status now requiring oxygen.     At this time family would like to focus on patient comfort. Patient w/ a DNR/DNI - MOLST in chart. Spoke to Kalani about starting morphine for dyspnea for symptom control in which she was agreeable. For now will continue medical treatment and re-assess patient. Family not opposed to the idea of hospice.

## 2020-06-26 NOTE — DISCHARGE NOTE NURSING/CASE MANAGEMENT/SOCIAL WORK - NSDCPEPTSTRK_GEN_ALL_CORE
Need for follow up after discharge/Signs and symptoms of stroke/Stroke education booklet/Stroke support groups for patients, families, and friends/Call 911 for stroke/Stroke warning signs and symptoms/Prescribed medications/Risk factors for stroke

## 2020-06-26 NOTE — PROGRESS NOTE ADULT - ATTENDING COMMENTS
Pt seen and examined- reviewed and agree w/ NP Johanna above and changes made where necessary.
Pt seen and examined.  Reviewed and agree w/ NP Johanna above and changes made where necessary.
Pt seen and examined- reviewed and agree w/ NP Johanna as above.  repeat h/h- if drop, will d/c eliquis.  continue lasix- change to po for d/c.  Supplemental O2.  Morphine SL.   Family meeting today- may want d/c today , comfort measures.
Pt seen and examined.  Reviewed and agree w/ NP Johanna above and changes made where necessary.   Monitor O2 sats.  Changed to po lasix. Repeat cxr in am to eval effusions.   continue eliquis.  d/c planning 1-2 days
Pt seen and examined.  Reviewed and agree w/ NP Johanna above and changes made where necessary.

## 2020-06-26 NOTE — CONSULT NOTE ADULT - ASSESSMENT
Pt is a 89y old Female with hx of HTN, CAD s/p stent, TIA 2017, HLD, pancreatic mass, spinal stenosis brought in from [facility] due to SOB, and new onset afib. Pt found to be hypoxic at 84% on RA and place on supp O2.  Pts hospital course notable for PE and DVT on Eliquis, acute systolic CHF on IV Lasix, pneumonia s/p abx, b/l pleural effusions.  Most recently with dropping Hb and + guaic.  Per chart family has expressed wishes for more of a comfort focused plan, wishing to forgo invasive interventions.  Palliative medicine consulted for assistance with goals of care.    1)SOB    2)Pain    3)DVT/PE    4)CHF with effusions    5)New Onset Afib    6)Gi Bleed Pt is a 89y old Female with hx of HTN, CAD s/p stent, TIA 2017, HLD, pancreatic mass, spinal stenosis brought in from [facility] due to SOB, and new onset afib. Pt found to be hypoxic at 84% on RA and place on supp O2.  Pts hospital course notable for PE and DVT on Eliquis, acute systolic CHF on IV Lasix, pneumonia s/p abx, b/l pleural effusions.  Most recently with dropping Hb and + guaic.  Per chart family has expressed wishes for more of a comfort focused plan, wishing to forgo invasive interventions.  Palliative medicine consulted for assistance with goals of care.    1)SOB  -Related to PE, CHF, pleural effusions, pneumonia  -s/p abx for pneumonia  -Cont supplemental oxygen  -On Lasix, Eliquis  -Morphine 2.5mg SL q 4hr PRN dyspnea    2)Pain  -Related to spinal stenosis and OA  -Tylenol PRN mild pain  -Morphine 2.5mg SL q 4hr PRN mod-severe pain    3)DVT/PE  -Imaging reviewed  -On Eliquis  -Cont supplemental oxygen    4)CHF with effusions  -Acute systolic CHF  -Complicated by effusions  -Consider switching IV Lasix to PO  -Cont beta blocker  -Supplemental oxygen PRN    5)New Onset Afib  -Cardio input reviewed  -Cont Beta blocker  -On AC    6)Gi Bleed  -Hb dropping with + guaic  -Repeat Hb at 1pm today  -Currently on Eliquis but may need to be held if worsening anemia    7)Advance Directives  -Currently pt not engaging in conversation so unable to assess capacity or have pt participate in decision making  -Pt has HCP naming her daughter Laura Ryder as primary and her daughter Kalani Talbert as her alternate agent  -MOLST on file indicating DNR, DNI  -Will contact pt daughter to discuss goals of care, dispo plan

## 2020-06-26 NOTE — DISCHARGE NOTE NURSING/CASE MANAGEMENT/SOCIAL WORK - PATIENT PORTAL LINK FT
You can access the FollowMyHealth Patient Portal offered by [facility] by registering at the following website: [email or web address]. By joining [employer]’s FollowMyHealth portal, you will also be able to view your health information using other applications (apps) compatible with our system.

## 2020-06-26 NOTE — PROGRESS NOTE ADULT - SUBJECTIVE AND OBJECTIVE BOX
CHIEF COMPLAINT/DIAGNOSIS: SOB    HPI: 89y female w/ pmh CAD s/p PCI x1, b/l hearing loss, pancreatic mass, HLD, HTN, OA, and spinal stenosis brought in from [facility] due to SOB, and new onset afib. Pt found to be hypoxic at 84% on RA. As per pt's daughter, pt's mental status has declined since a TIA in 2017, especially since the COVID pandemic as she has been largely isolated in her room; used to ambulate with walker but not as of recently. patient admitted to  and is being treated for Acute respiratory failure w/ hypoxia due to acute PE, CHF exacerbation, PNA, new onset afib and severe pulmonary htn.      SUBJECTIVE:  6/23 - still w/ + body pain, visibly less SOB, no acute distress. denies cp   6/24 - no distress, upright in chair, edema present, still + sob  6/25 - + back itching, + body weakness, + sob  6/26 -     REVIEW OF SYSTEMS:  All other review of systems is negative unless indicated above    Vital Signs Last 24 Hrs  T(C): 36.3 (26 Jun 2020 09:54), Max: 36.4 (25 Jun 2020 17:15)  T(F): 97.4 (26 Jun 2020 09:54), Max: 97.6 (25 Jun 2020 17:15)  HR: 115 (26 Jun 2020 09:54) (86 - 115)  BP: 102/75 (26 Jun 2020 09:54) (94/62 - 113/78)  BP(mean): --  RR: 18 (26 Jun 2020 09:54) (18 - 18)  SpO2: 99% (26 Jun 2020 09:54) (99% - 100%)    PHYSICAL EXAM:  Gen - chronic ill appearing elderly female, seated in chair; NAD  Neuro: AAOx2; fluent speech, following commands, answering questions appropriately,   HEENT- PERRL, moist mucus membranes, OP clear  CV - IRIR, +murmur, No r/g  Lungs - Good effort, Clear to auscultation bilaterally  back- kyphotic  Abdomen - Soft, nontender, nondistended, +BS, No rebound/rigidity/guarding  Ext - No cyanosis/clubbing.  mild b/l edema  Skin - No rashes, No jaundice      LABS: All Labs Reviewed:                        10.0   4.25  )-----------( 148      ( 26 Jun 2020 07:33 )             33.1     06-26    136  |  99  |  42<H>  ----------------------------<  95  3.8   |  35<H>  |  0.97    Ca    9.2      26 Jun 2020 07:33    MEDICATIONS  (STANDING):  acetaminophen   Tablet .. 500 milliGRAM(s) Oral daily  apixaban 5 milliGRAM(s) Oral every 12 hours  atorvastatin 10 milliGRAM(s) Oral at bedtime  furosemide   Injectable 20 milliGRAM(s) IV Push two times a day  metoprolol succinate ER 50 milliGRAM(s) Oral daily  senna 2 Tablet(s) Oral at bedtime    MEDICATIONS  (PRN):  acetaminophen   Tablet .. 650 milliGRAM(s) Oral every 6 hours PRN Temp greater or equal to 38C (100.4F), Mild Pain (1 - 3)  morphine  - Injectable 1 milliGRAM(s) IV Push every 3 hours PRN dyspnea    TELEMETRY REVIEW:  6/22: afib 90s  6/23: afib 80-90s  6/24 - afib 120s briefly   6/25 - afib 100-110s  6/26 - afib 80-100s, brief episodes of 110-120s      ASSESSMENT AND PLAN:    1. Acute hypoxic respiratory failure due to acute PE, HCAP (GNR),  mod b/l effusions/ acute systolic CHF exacerbation  - tele monitoring. tele review as above. afib 90s  - cont. supplemental 02 -- currently using 3/4L--> titrate down  - COVID PCR negative, COVID19 IgG Antibody positive  - completed iv antibiotics   - Morphine PRN for dyspnea  - eliquis for PE   - IV lasix for effusions/ chf, Strict I/O, daily wts  .  CT chest 6/18/20, repeat CXR in AM -- switched to PO lasix 40mg daily d/w cardio  6/24 - repeat cxr w/ progression of small pleural effusions, will given extra 40mg Iv lasix tonight  6/25- didn't receive iv lasix yesterday evening.   change to IV Lasix 20mg BID    2.  Acute LLE DVT/ chronic LLE DVT   - Cont. Eliquis    3. New Onset Afib  - Afib rate controlled  - CHADSVASC: 5, started on Eliquis 10 mg bid (getting loading dose for acute PE/DVT) -- will start 5mg 6/25  - TSH wnl  - s/p cardizem gtt. -- d/c Cardizem PO d/w cardio  - continue home dose toprol  - cardio f/u appreciated     4. Acute on chronic systolic CHF, pleural effusions  as above-  continue iv Lasix   - Echo 6/20- EF 30-35%, mod to severe MR, mod TR, severe pulm htn , small to mod pericardial effusion   - Daily weight / low sodium diet / FR 1500ml    - continue BB.  Add ACEi when BP can tolerate   - cardio f/u appreciated      5. GI Bleed  - FOBT positive, H&Hstable   - cont. to monitor on eliquis  - d/w daughter - will defer GI at this time. may opt for a total comfort approach.    6. Elevated troponin  - Likely due to demand ischemia- chf, afib, PE  - Cont. statin    7. Elevated creatinine - resolved  - Did not meet criteria for UMBERTO  - Monitor renal function closely while on Lasix, avoid nephrotoxic agents    8. OA, chronic pain  - Cont. Tylenol    9. DVT PPX  - PO Eliquis     ADVANCE DIRECTIVES/ CODE STATUS: DNR/DNI - MOLST on chart. See [facility] note.    6/25 - spoke w/ daughter dipika again. fully aware of worsening CHF/lung congestion with need to switch back to IV lasix. not interested in any aggressive measures. May opt for total comfort approach in light of progression of CHF and new found GIB however, would like to inform her brother and daughter (etelvina) of new findings and options moving forward. CHIEF COMPLAINT/DIAGNOSIS: SOB    HPI: 89y female w/ pmh CAD s/p PCI x1, b/l hearing loss, pancreatic mass, HLD, HTN, OA, and [facility], and new onset afib. Pt found to be hypoxic at 84% on RA. As per pt's daughter, pt's mental status has declined since a TIA in 2017, especially since the COVID pandemic as she has been largely isolated in her room; used to ambulate with walker but not as of recently. patient admitted to  and is being treated for Acute respiratory failure w/ hypoxia due to acute PE, CHF exacerbation, PNA, new onset afib and severe pulmonary htn.      SUBJECTIVE:  6/23 - still w/ + body pain, visibly less SOB, no acute distress. denies cp   6/24 - no distress, upright in chair, edema present, still + sob  6/25 - + back itching, + body weakness, + sob  6/26 - still with itching, not c/o of sob, no acute distress     REVIEW OF SYSTEMS:  All other review of systems is negative unless indicated above    Vital Signs Last 24 Hrs  T(C): 36.3 (26 Jun 2020 09:54), Max: 36.4 (25 Jun 2020 17:15)  T(F): 97.4 (26 Jun 2020 09:54), Max: 97.6 (25 Jun 2020 17:15)  HR: 115 (26 Jun 2020 09:54) (86 - 115)  BP: 102/75 (26 Jun 2020 09:54) (94/62 - 113/78)  BP(mean): --  RR: 18 (26 Jun 2020 09:54) (18 - 18)  SpO2: 99% (26 Jun 2020 09:54) (99% - 100%) -- 3/4L NC    PHYSICAL EXAM:  Gen - chronic ill appearing elderly female, seated in chair; NAD  Neuro: AAOx2; fluent speech, following commands, answering questions appropriately,   HEENT- PERRL, moist mucus membranes, OP clear  CV - IRIR, +murmur, No r/g  Lungs - Good effort, Clear to auscultation bilaterally  back- kyphotic  Abdomen - Soft, nontender, nondistended, +BS, No rebound/rigidity/guarding  Ext - No cyanosis/clubbing.  mild b/l edema  Skin - No rashes, No jaundice      LABS: All Labs Reviewed:                        10.0   4.25  )-----------( 148      ( 26 Jun 2020 07:33 )             33.1     06-26    136  |  99  |  42<H>  ----------------------------<  95  3.8   |  35<H>  |  0.97    Ca    9.2      26 Jun 2020 07:33    MEDICATIONS  (STANDING):  acetaminophen   Tablet .. 500 milliGRAM(s) Oral daily  apixaban 5 milliGRAM(s) Oral every 12 hours  atorvastatin 10 milliGRAM(s) Oral at bedtime  furosemide   Injectable 20 milliGRAM(s) IV Push two times a day  hydrocortisone 1% Cream 1 Application(s) Topical two times a day  metoprolol succinate ER 50 milliGRAM(s) Oral daily  senna 2 Tablet(s) Oral at bedtime    MEDICATIONS  (PRN):  acetaminophen   Tablet .. 650 milliGRAM(s) Oral every 6 hours PRN Temp greater or equal to 38C (100.4F), Mild Pain (1 - 3)  morphine  - Injectable 1 milliGRAM(s) IV Push every 3 hours PRN dyspnea      TELEMETRY REVIEW:  6/22: afib 90s  6/23: afib 80-90s  6/24 - afib 120s briefly   6/25 - afib 100-110s  6/26 - afib 80-100s, brief episodes of 110-120s      ASSESSMENT AND PLAN:    1. Acute hypoxic respiratory failure due to acute PE, HCAP (GNR),  mod b/l effusions/ acute systolic CHF exacerbation  - tele monitoring. tele review as above. afib 90s  - cont. supplemental 02 -- currently using 3/4L--> titrate down  - COVID PCR negative, COVID19 IgG Antibody positive  - completed iv antibiotics   - Morphine PRN for dyspnea  - eliquis for PE   - IV lasix for effusions/ chf, Strict I/O, daily wts  .  CT chest 6/18/20, repeat CXR in AM -- switched to PO lasix 40mg daily d/w cardio  6/24 - repeat cxr w/ progression of small pleural effusions, will given extra 40mg Iv lasix tonight  6/25- didn't receive iv lasix yesterday evening.   change to IV Lasix 20mg BID    2.  Acute LLE DVT/ chronic LLE DVT   - Cont. Eliquis  - monitor H&H    3. New Onset Afib  - Afib rate controlled  - CHADSVASC: 5, started on Eliquis 10 mg bid (getting loading dose for acute PE/DVT) -- will start 5mg 6/25  - TSH wnl  - s/p cardizem gtt. -- d/c Cardizem PO d/w cardio  - continue home dose toprol  - cardio f/u appreciated     4. Acute on chronic systolic CHF, pleural effusions  as above-  continue iv Lasix   - Echo 6/20- EF 30-35%, mod to severe MR, mod TR, severe pulm htn , small to mod pericardial effusion   - Daily weight / low sodium diet / FR 1500ml    - continue BB.  Add ACEi when BP can tolerate   - cardio f/u appreciated      5. GI Bleed  - FOBT positive, H&H dropping  - cont. to monitor on eliquis  - d/w daughter - will defer GI at this time. may opt for a total comfort approach.   - palliative cx  6/26 - repeat H&H at 1pm, consider d/c Eliquis if H&H cont. to drop    6. Elevated troponin  - Likely due to demand ischemia- chf, afib, PE  - Cont. statin    7. Elevated creatinine - resolved  - Did not meet criteria for UMBERTO  - Monitor renal function closely while on Lasix, avoid nephrotoxic agents    8. OA, chronic pain  - Cont. Tylenol    9. DVT PPX  - PO Eliquis     ADVANCE DIRECTIVES/ CODE STATUS: DNR/DNI - MOLST on chart. See [facility] note. CHIEF COMPLAINT/DIAGNOSIS: SOB    HPI: 89y female w/ pmh CAD s/p PCI x1, b/l hearing loss, pancreatic mass, HLD, HTN, OA, and spinal stenosis brought in from [facility] due to SOB, and new onset afib. Pt found to be hypoxic at 84% on RA. As per pt's daughter, pt's mental status has declined since a TIA in 2017, especially since the COVID pandemic as she has been largely isolated in her room; used to ambulate with walker but not as of recently. patient admitted to  and is being treated for Acute respiratory failure w/ hypoxia due to acute PE, CHF exacerbation, PNA, new onset afib and severe pulmonary htn.      SUBJECTIVE:  6/23 - still w/ + body pain, visibly less SOB, no acute distress. denies cp   6/24 - no distress, upright in chair, edema present, still + sob  6/25 - + back itching, + body weakness, + sob  6/26 - still with itching, not c/o of sob, no acute distress     REVIEW OF SYSTEMS:  All other review of systems is negative unless indicated above    Vital Signs Last 24 Hrs  T(C): 36.3 (26 Jun 2020 09:54), Max: 36.4 (25 Jun 2020 17:15)  T(F): 97.4 (26 Jun 2020 09:54), Max: 97.6 (25 Jun 2020 17:15)  HR: 115 (26 Jun 2020 09:54) (86 - 115)  BP: 102/75 (26 Jun 2020 09:54) (94/62 - 113/78)  BP(mean): --  RR: 18 (26 Jun 2020 09:54) (18 - 18)  SpO2: 99% (26 Jun 2020 09:54) (99% - 100%) -- 3/4L NC    PHYSICAL EXAM:  Gen - chronic ill appearing elderly female, seated in chair; NAD  Neuro: AAOx2; fluent speech, following commands, answering questions appropriately,   HEENT- PERRL, moist mucus membranes, OP clear  CV - IRIR, +murmur, No r/g  Lungs - Good effort, Clear to auscultation bilaterally  back- kyphotic  Abdomen - Soft, nontender, nondistended, +BS, No rebound/rigidity/guarding  Ext - No cyanosis/clubbing.  mild b/l edema  Skin - No rashes, No jaundice      LABS: All Labs Reviewed:                        10.0   4.25  )-----------( 148      ( 26 Jun 2020 07:33 )             33.1     06-26    136  |  99  |  42<H>  ----------------------------<  95  3.8   |  35<H>  |  0.97    Ca    9.2      26 Jun 2020 07:33    MEDICATIONS  (STANDING):  acetaminophen   Tablet .. 500 milliGRAM(s) Oral daily  apixaban 5 milliGRAM(s) Oral every 12 hours  atorvastatin 10 milliGRAM(s) Oral at bedtime  furosemide   Injectable 20 milliGRAM(s) IV Push two times a day  hydrocortisone 1% Cream 1 Application(s) Topical two times a day  metoprolol succinate ER 50 milliGRAM(s) Oral daily  pantoprazole    Tablet 40 milliGRAM(s) Oral two times a day  senna 2 Tablet(s) Oral at bedtime    MEDICATIONS  (PRN):  acetaminophen   Tablet .. 650 milliGRAM(s) Oral every 6 hours PRN Temp greater or equal to 38C (100.4F), Mild Pain (1 - 3)  morphine  - Injectable 1 milliGRAM(s) IV Push every 3 hours PRN dyspnea      TELEMETRY REVIEW:  6/22: afib 90s  6/23: afib 80-90s  6/24 - afib 120s briefly   6/25 - afib 100-110s  6/26 - afib 80-100s, brief episodes of 110-120s      ASSESSMENT AND PLAN:    1. Acute hypoxic respiratory failure due to acute PE, HCAP (GNR),  mod b/l effusions/ acute systolic CHF exacerbation  - tele monitoring. tele review as above. afib 90s  - cont. supplemental 02 -- currently using 3/4L--> titrate down  - COVID PCR negative, COVID19 IgG Antibody positive  - completed iv antibiotics   - Morphine PRN for dyspnea  - eliquis for PE   - IV lasix for effusions/ chf, Strict I/O, daily wts  .  CT chest 6/18/20, repeat CXR in AM -- switched to PO lasix 40mg daily d/w cardio  6/24 - repeat cxr w/ progression of small pleural effusions, will given extra 40mg Iv lasix tonight  6/25- didn't receive iv lasix yesterday evening.   change to IV Lasix 20mg BID  6/26- change to po lasix for d/c     2.  Acute LLE DVT/ chronic LLE DVT   - Cont. Eliquis  - monitor H&H    3. New Onset Afib  - Afib rate controlled  - CHADSVASC: 5, started on Eliquis 10 mg bid (getting loading dose for acute PE/DVT) -- will start 5mg 6/25  - TSH wnl  - s/p cardizem gtt. -- d/c Cardizem PO d/w cardio  - continue home dose toprol  - cardio f/u appreciated     4. Acute on chronic systolic CHF, pleural effusions  as above-  continue iv Lasix   - Echo 6/20- EF 30-35%, mod to severe MR, mod TR, severe pulm htn , small to mod pericardial effusion   - Daily weight / low sodium diet / FR 1500ml    - continue BB.  Add ACEi when BP can tolerate   - cardio f/u appreciated      5. GI Bleed  - FOBT positive, H&H dropping  - cont. to monitor on eliquis  - d/w daughter - will defer GI at this time. may opt for a total comfort approach.   - palliative cx  6/26 - repeat H&H at 1pm, will stop  Eliquis if H&H cont. to drop    6. Elevated troponin  - Likely due to demand ischemia- chf, afib, PE  - Cont. statin    7. Elevated creatinine - resolved  - Did not meet criteria for UMBERTO  - Monitor renal function closely while on Lasix, avoid nephrotoxic agents    8. OA, chronic pain  - Cont. Tylenol    9. DVT PPX  - PO Eliquis     ADVANCE DIRECTIVES/ CODE STATUS: DNR/DNI - MOLST on chart. See C note.  Palliative Care f/u appreciated.   Morphine SL for pain. Supplemental O2.

## 2020-06-26 NOTE — PROGRESS NOTE ADULT - NSHPATTENDINGPLANDISCUSS_GEN_ALL_CORE
patient, RN
patient, RN and pt's daughter
patient, team, pt's daughter Kalani
patient, team, pt's daughter Kalani. d/w Dr. Campbell
pt,team
pt,team
pt, Cardio, team
team
pt, team

## 2020-06-26 NOTE — CONSULT NOTE ADULT - SUBJECTIVE AND OBJECTIVE BOX
HPI: Pt is a 89y old Female with hx of HTN, CAD s/p stent, TIA 2017, HLD, pancreatic mass, spinal stenosis brought in from [facility] due to SOB, and new onset afib. Pt found to be hypoxic at 84% on RA and place on supp O2.  Pts hospital course notable for PE and DVT on Eliquis, acute systolic CHF on IV Lasix, pneumonia s/p abx, b/l pleural effusions.  Most recently with dropping Hb and + guaic.  Per chart family has expressed wishes for more of a comfort focused plan, wishing to forgo invasive interventions.  Palliative medicine consulted for assistance with goals of care.    Pt seen by me for evaluation of goals of care and sx management.  Pt was sleeping upon my arrival.  when I tired to wake her by touching her hand she stated "Don't touch me".  She advised me she was napping and to leave her be.  I tried to explain that I was there to see her to help treat her pain and help make her breathing comfortable.  She replied she is being treated already.  I suggested I could return when she was more awake and she declined.  Unable to obtain any hx, ROS due to lack of pts cooperation, interest in speaking with me.  She appeared comfortable. Medical team on floor share that she reports pain frequently and occasional SOB.      PAIN: ( )Yes   (x )No  Pt would not answer questions but no nonverbal signs of pain    DYSPNEA: ( ) Yes  (x ) No  Pt would not answer questions but no nonverbal signs of SOB      PAST MEDICAL & SURGICAL HISTORY:  Transient ischemic attack (TIA): 2017  Hypercholesterolemia  Hypertension, unspecified type  PVCs (premature ventricular contractions)  Deviated septum  Pancreatic mass  Arteriosclerotic heart disease (ASHD)  Spinal stenosis, unspecified spinal region  Osteoarthritis, unspecified osteoarthritis type, unspecified site  History of coronary artery stent placement: approx       SOCIAL HX: Resident of [facility]    Hx opiate tolerance ( )YES  ( )NO  No per chart review though tolerated Morphine this admission    Baseline ADLs  (Prior to Admission)  ( ) Independent   ( )Dependent  Increasing dependence recently per chart review    FAMILY HISTORY:  No pertinent family history in first degree relatives (per chart documentation  Pt unable to provide as she would not engage in discussion with me      Review of Systems:    Anxiety-  Depression-  Physical Discomfort-  Dyspnea-  Constipation-  Diarrhea-  Nausea-  Vomiting-  Anorexia-  Weight Loss-   Cough-  Secretions-  Fatigue-  Weakness-  Delirium-    Unable to obtain due to: pt unwilling to engage in discussion with me      PHYSICAL EXAM:    Vital Signs Last 24 Hrs  T(C): 36.3 (2020 09:54), Max: 36.4 (2020 17:15)  T(F): 97.4 (2020 09:54), Max: 97.6 (2020 17:15)  HR: 115 (2020 09:54) (86 - 115)  BP: 102/75 (2020 09:54) (94/62 - 113/78)  RR: 18 (2020 09:54) (18 - 18)  SpO2: 99% (2020 09:54) (99% - 100%)  Daily Weight in k (2020 06:04)    PPSV2: 30-40  %  FAST: unable to assess at this time    General: sleeping, easy to arouse, declines to converse with me or to be examined  Mental Status:  HEENT: eyes closed, dry oral mucosa, NC in place  Lungs:  Cardiac:  GI:  :  Ext:  Neuro:      LABS:                        10.0   4.25  )-----------( 148      ( 2020 07:33 )             33.1     06-26    136  |  99  |  42<H>  ----------------------------<  95  3.8   |  35<H>  |  0.97    Ca    9.2      2020 07:33      Allergies  codeine (Other)    MEDICATIONS  (STANDING):  acetaminophen   Tablet .. 500 milliGRAM(s) Oral daily  apixaban 5 milliGRAM(s) Oral every 12 hours  atorvastatin 10 milliGRAM(s) Oral at bedtime  furosemide   Injectable 20 milliGRAM(s) IV Push two times a day  metoprolol succinate ER 50 milliGRAM(s) Oral daily  senna 2 Tablet(s) Oral at bedtime    MEDICATIONS  (PRN):  acetaminophen   Tablet .. 650 milliGRAM(s) Oral every 6 hours PRN Temp greater or equal to 38C (100.4F), Mild Pain (1 - 3)  morphine  - Injectable 1 milliGRAM(s) IV Push every 3 hours PRN dyspnea      RADIOLOGY/ADDITIONAL STUDIES:    EXAM:  XR CHEST PORTABLE ROUTINE 1V                        PROCEDURE DATE:  2020    COMPARISON STUDY: 2020    Frontal expiratory view of the chest shows the heartto be similarly enlarged in size. The lungs show partial clearing of the right upper lobe with progression of pleural effusions. There is no evidence of pneumothorax.    IMPRESSION:  Progression of small pleural effusions.    EXAM:  CTA CHEST PE PROTOCOL (W)AW IC                        PROCEDURE DATE:  2020    COMPARISON: [facility] 2013    PULMONARY ARTERIES: Large right lower lobe pulmonary artery embolus. Asymmetric dilatation of the right-sided cardiac chambers with reflux of contrast into dilated IVC and hepatic veins suggesting elevated right heart pressures.    LUNGS, AIRWAYS: The central airways are patent. Mild pulmonary edema and bibasilar atelectasis.    PLEURA: Moderate bilateral effusions.    VESSELS: Normal caliber aorta. No dissection.    HEART: Moderate cardiomegaly. Small pericardial effusion. Coronary artery calcifications are present.    MEDIASTINUM, MEDHAT, AXILLAE: No adenopathy.    UPPER ABDOMEN: Limited visualization is unremarkable.    BONES AND CHEST WALL: No acute bony abnormality.    IMPRESSION:     Large right lower lobe pulmonary artery embolus. Asymmetric dilatation of the right-sided cardiac chambers with reflux of contrast into dilated IVC and hepatic veins suggesting elevated right heart pressures.    Pulmonary edema and moderate bilateral pleural effusions.     These results were communicated to Dr. Tong by me over telephone at 2:30 PM on 2020.      EXAM:  US DPLX LWR EXT VEINS COMPL BI                        PROCEDURE DATE:  2020    COMPARISON: None available.  FINDINGS:    An eccentric echogenic lesion is seen in the distal left femoral vein with partial compression likely due to a chronic deep venous thrombosis. Intraluminal echogenicity with lack of compression and partial color flow is seen in the left posterior tibial vein compatible with a nonocclusive deep venous thrombosis. There is no deep venous thrombosis in the left common femoral, proximal to mid femoral or peroneal veins. The left popliteal vein is not visualized.    There is normal compressibility of the right common femoral and femoral veins. The right popliteal vein is not visualized. Doppler examination shows normal spontaneous and phasic flow. No right calf vein thrombosis is detected.    IMPRESSION:     1. Acute deep venous thrombosis in the left posterior tibial vein. Chronic deep venous thrombosis in the distal left femoral vein.  2. No evidence of a deep venous thrombosis in the right lower extremity. Nonvisualization of the right popliteal vein.      EXAM:  XR CHEST PORTABLE IMMED 1V                        PROCEDURE DATE:  2020    COMPARISON: 2019 available for review.    FINDINGS:   The lungs  show perihilar and basilar M interstitial opacities RIGHT greater than LEFT are concerning for pulmonary edema of cardiac or noncardiac origin. Bilateral diaphragms contours not visualized indicating lower lobe opacity/infiltrates.    The  heart is enlarged in transverse diameter. No hilar mass. Trachea midline.   Visualized osseous structures are intact.        IMPRESSION:   Cardiomegaly with bilateralRIGHT greater than LEFT perihilar and basilar airspace consolidations concerning for pulmonary edema of cardiac or noncardiac origin. Superimposed infectious pneumonia should be considered..

## 2020-06-27 ENCOUNTER — OUTPATIENT (OUTPATIENT)
Dept: OUTPATIENT SERVICES | Facility: HOSPITAL | Age: 85
LOS: 1 days | End: 2020-06-27

## 2020-07-01 ENCOUNTER — OUTPATIENT (OUTPATIENT)
Dept: OUTPATIENT SERVICES | Facility: HOSPITAL | Age: 85
LOS: 1 days | End: 2020-07-01

## 2020-07-01 DIAGNOSIS — I82.442 ACUTE EMBOLISM AND THROMBOSIS OF LEFT TIBIAL VEIN: ICD-10-CM

## 2020-07-01 DIAGNOSIS — J96.01 ACUTE RESPIRATORY FAILURE WITH HYPOXIA: ICD-10-CM

## 2020-07-01 DIAGNOSIS — Z66 DO NOT RESUSCITATE: ICD-10-CM

## 2020-07-01 DIAGNOSIS — Z86.73 PERSONAL HISTORY OF TRANSIENT ISCHEMIC ATTACK (TIA), AND CEREBRAL INFARCTION WITHOUT RESIDUAL DEFICITS: ICD-10-CM

## 2020-07-01 DIAGNOSIS — I24.8 OTHER FORMS OF ACUTE ISCHEMIC HEART DISEASE: ICD-10-CM

## 2020-07-01 DIAGNOSIS — I42.9 CARDIOMYOPATHY, UNSPECIFIED: ICD-10-CM

## 2020-07-01 DIAGNOSIS — I48.91 UNSPECIFIED ATRIAL FIBRILLATION: ICD-10-CM

## 2020-07-01 DIAGNOSIS — M48.00 SPINAL STENOSIS, SITE UNSPECIFIED: ICD-10-CM

## 2020-07-01 DIAGNOSIS — R79.89 OTHER SPECIFIED ABNORMAL FINDINGS OF BLOOD CHEMISTRY: ICD-10-CM

## 2020-07-01 DIAGNOSIS — J15.6 PNEUMONIA DUE TO OTHER GRAM-NEGATIVE BACTERIA: ICD-10-CM

## 2020-07-01 DIAGNOSIS — H91.93 UNSPECIFIED HEARING LOSS, BILATERAL: ICD-10-CM

## 2020-07-01 DIAGNOSIS — Z51.5 ENCOUNTER FOR PALLIATIVE CARE: ICD-10-CM

## 2020-07-01 DIAGNOSIS — I27.20 PULMONARY HYPERTENSION, UNSPECIFIED: ICD-10-CM

## 2020-07-01 DIAGNOSIS — I82.512 CHRONIC EMBOLISM AND THROMBOSIS OF LEFT FEMORAL VEIN: ICD-10-CM

## 2020-07-01 DIAGNOSIS — Z95.5 PRESENCE OF CORONARY ANGIOPLASTY IMPLANT AND GRAFT: ICD-10-CM

## 2020-07-01 DIAGNOSIS — I50.23 ACUTE ON CHRONIC SYSTOLIC (CONGESTIVE) HEART FAILURE: ICD-10-CM

## 2020-07-01 DIAGNOSIS — I11.0 HYPERTENSIVE HEART DISEASE WITH HEART FAILURE: ICD-10-CM

## 2020-07-01 DIAGNOSIS — G89.29 OTHER CHRONIC PAIN: ICD-10-CM

## 2020-07-01 DIAGNOSIS — I34.0 NONRHEUMATIC MITRAL (VALVE) INSUFFICIENCY: ICD-10-CM

## 2020-07-01 DIAGNOSIS — E78.5 HYPERLIPIDEMIA, UNSPECIFIED: ICD-10-CM

## 2020-07-01 DIAGNOSIS — Z79.899 OTHER LONG TERM (CURRENT) DRUG THERAPY: ICD-10-CM

## 2020-07-01 DIAGNOSIS — I25.10 ATHEROSCLEROTIC HEART DISEASE OF NATIVE CORONARY ARTERY WITHOUT ANGINA PECTORIS: ICD-10-CM

## 2020-07-01 DIAGNOSIS — K92.2 GASTROINTESTINAL HEMORRHAGE, UNSPECIFIED: ICD-10-CM

## 2020-07-01 DIAGNOSIS — Z11.59 ENCOUNTER FOR SCREENING FOR OTHER VIRAL DISEASES: ICD-10-CM

## 2020-07-01 DIAGNOSIS — I26.99 OTHER PULMONARY EMBOLISM WITHOUT ACUTE COR PULMONALE: ICD-10-CM

## 2020-07-01 DIAGNOSIS — M19.90 UNSPECIFIED OSTEOARTHRITIS, UNSPECIFIED SITE: ICD-10-CM

## 2020-07-01 DIAGNOSIS — I31.3 PERICARDIAL EFFUSION (NONINFLAMMATORY): ICD-10-CM

## 2020-07-01 DIAGNOSIS — I07.1 RHEUMATIC TRICUSPID INSUFFICIENCY: ICD-10-CM

## 2020-07-03 ENCOUNTER — OUTPATIENT (OUTPATIENT)
Dept: OUTPATIENT SERVICES | Facility: HOSPITAL | Age: 85
LOS: 1 days | End: 2020-07-03

## 2020-07-04 ENCOUNTER — OUTPATIENT (OUTPATIENT)
Dept: OUTPATIENT SERVICES | Facility: HOSPITAL | Age: 85
LOS: 1 days | End: 2020-07-04

## 2020-07-08 ENCOUNTER — OUTPATIENT (OUTPATIENT)
Dept: OUTPATIENT SERVICES | Facility: HOSPITAL | Age: 85
LOS: 1 days | End: 2020-07-08

## 2020-07-10 ENCOUNTER — OUTPATIENT (OUTPATIENT)
Dept: OUTPATIENT SERVICES | Facility: HOSPITAL | Age: 85
LOS: 1 days | End: 2020-07-10

## 2020-07-11 ENCOUNTER — OUTPATIENT (OUTPATIENT)
Dept: OUTPATIENT SERVICES | Facility: HOSPITAL | Age: 85
LOS: 1 days | End: 2020-07-11

## 2020-07-14 ENCOUNTER — OUTPATIENT (OUTPATIENT)
Dept: OUTPATIENT SERVICES | Facility: HOSPITAL | Age: 85
LOS: 1 days | End: 2020-07-14

## 2020-07-14 DIAGNOSIS — Z95.5 PRESENCE OF CORONARY ANGIOPLASTY IMPLANT AND GRAFT: Chronic | ICD-10-CM

## 2020-07-17 ENCOUNTER — OUTPATIENT (OUTPATIENT)
Dept: OUTPATIENT SERVICES | Facility: HOSPITAL | Age: 85
LOS: 1 days | End: 2020-07-17

## 2020-07-18 ENCOUNTER — OUTPATIENT (OUTPATIENT)
Dept: OUTPATIENT SERVICES | Facility: HOSPITAL | Age: 85
LOS: 1 days | End: 2020-07-18

## 2020-07-21 ENCOUNTER — OUTPATIENT (OUTPATIENT)
Dept: OUTPATIENT SERVICES | Facility: HOSPITAL | Age: 85
LOS: 1 days | End: 2020-07-21

## 2020-07-25 ENCOUNTER — OUTPATIENT (OUTPATIENT)
Dept: OUTPATIENT SERVICES | Facility: HOSPITAL | Age: 85
LOS: 1 days | End: 2020-07-25

## 2020-07-28 ENCOUNTER — OUTPATIENT (OUTPATIENT)
Dept: OUTPATIENT SERVICES | Facility: HOSPITAL | Age: 85
LOS: 1 days | End: 2020-07-28

## 2020-07-31 ENCOUNTER — OUTPATIENT (OUTPATIENT)
Dept: OUTPATIENT SERVICES | Facility: HOSPITAL | Age: 85
LOS: 1 days | End: 2020-07-31

## 2020-08-01 ENCOUNTER — OUTPATIENT (OUTPATIENT)
Dept: OUTPATIENT SERVICES | Facility: HOSPITAL | Age: 85
LOS: 1 days | End: 2020-08-01

## 2020-08-03 ENCOUNTER — OUTPATIENT (OUTPATIENT)
Dept: OUTPATIENT SERVICES | Facility: HOSPITAL | Age: 85
LOS: 1 days | End: 2020-08-03

## 2020-08-04 ENCOUNTER — OUTPATIENT (OUTPATIENT)
Dept: OUTPATIENT SERVICES | Facility: HOSPITAL | Age: 85
LOS: 1 days | End: 2020-08-04

## 2020-08-07 ENCOUNTER — OUTPATIENT (OUTPATIENT)
Dept: OUTPATIENT SERVICES | Facility: HOSPITAL | Age: 85
LOS: 1 days | End: 2020-08-07

## 2020-08-11 ENCOUNTER — OUTPATIENT (OUTPATIENT)
Dept: OUTPATIENT SERVICES | Facility: HOSPITAL | Age: 85
LOS: 1 days | End: 2020-08-11

## 2020-08-14 ENCOUNTER — OUTPATIENT (OUTPATIENT)
Dept: OUTPATIENT SERVICES | Facility: HOSPITAL | Age: 85
LOS: 1 days | End: 2020-08-14

## 2020-08-18 ENCOUNTER — OUTPATIENT (OUTPATIENT)
Dept: OUTPATIENT SERVICES | Facility: HOSPITAL | Age: 85
LOS: 1 days | End: 2020-08-18

## 2020-08-21 ENCOUNTER — OUTPATIENT (OUTPATIENT)
Dept: OUTPATIENT SERVICES | Facility: HOSPITAL | Age: 85
LOS: 1 days | End: 2020-08-21

## 2020-08-25 ENCOUNTER — OUTPATIENT (OUTPATIENT)
Dept: OUTPATIENT SERVICES | Facility: HOSPITAL | Age: 85
LOS: 1 days | End: 2020-08-25

## 2020-08-28 ENCOUNTER — OUTPATIENT (OUTPATIENT)
Dept: OUTPATIENT SERVICES | Facility: HOSPITAL | Age: 85
LOS: 1 days | End: 2020-08-28

## 2020-09-01 ENCOUNTER — OUTPATIENT (OUTPATIENT)
Dept: OUTPATIENT SERVICES | Facility: HOSPITAL | Age: 85
LOS: 1 days | End: 2020-09-01

## 2020-09-04 ENCOUNTER — OUTPATIENT (OUTPATIENT)
Dept: OUTPATIENT SERVICES | Facility: HOSPITAL | Age: 85
LOS: 1 days | End: 2020-09-04

## 2020-09-08 ENCOUNTER — OUTPATIENT (OUTPATIENT)
Dept: OUTPATIENT SERVICES | Facility: HOSPITAL | Age: 85
LOS: 1 days | End: 2020-09-08

## 2020-09-09 ENCOUNTER — OUTPATIENT (OUTPATIENT)
Dept: OUTPATIENT SERVICES | Facility: HOSPITAL | Age: 85
LOS: 1 days | End: 2020-09-09

## 2020-09-14 ENCOUNTER — OUTPATIENT (OUTPATIENT)
Dept: OUTPATIENT SERVICES | Facility: HOSPITAL | Age: 85
LOS: 1 days | End: 2020-09-14

## 2023-11-04 NOTE — ED ADULT NURSE NOTE - FINAL NURSING ELECTRONIC SIGNATURE
Order received for PICC placement.  Patient ok for bedside PICC.  No plan for discharge at this time and patient to return to OR 11/8.  Patient has PIV access.  Will plan PICC placement closer to discharge.   10-Daniel-2019 05:27

## 2023-11-29 NOTE — ED ADULT NURSE NOTE - NSFALLRSKINDICTYPE_ED_ALL_ED
The patient is Stable - Low risk of patient condition declining or worsening    Shift Goals  Clinical Goals: safety  Patient Goals: rest  Family Goals: Not present    Progress made toward(s) clinical / shift goals:  IS given/encouraged, NAD today.    Patient is not progressing towards the following goals:      Problem: Knowledge Deficit - Standard  Goal: Patient and family/care givers will demonstrate understanding of plan of care, disease process/condition, diagnostic tests and medications  11/28/2023 1750 by Hang Garces R.N.  Outcome: Progressing  11/28/2023 1750 by Hang Garces R.N.  Outcome: Progressing  11/28/2023 1749 by Hang Garces R.N.  Outcome: Progressing     Problem: Respiratory  Goal: Patient will achieve/maintain optimum respiratory ventilation and gas exchange  11/28/2023 1750 by Hang Garces R.N.  Outcome: Progressing  11/28/2023 1750 by Hang Garces R.N.  Outcome: Progressing  11/28/2023 1749 by Hang Garces R.N.  Outcome: Progressing     Problem: Fluid Volume  Goal: Fluid volume balance will be maintained  Outcome: Progressing      Need for Mobility Assisted Device/Impaired Gait

## 2024-06-19 PROBLEM — G45.9 TRANSIENT CEREBRAL ISCHEMIC ATTACK, UNSPECIFIED: Chronic | Status: ACTIVE | Noted: 2020-06-18

## 2025-02-01 NOTE — ED ADULT TRIAGE NOTE - HEART RATE (BEATS/MIN)
68 pt was wheeled into triage C/O left arm pain that radiates to the chest states she was feeling palpations earlier today.